# Patient Record
Sex: FEMALE | Race: WHITE | Employment: OTHER | URBAN - METROPOLITAN AREA
[De-identification: names, ages, dates, MRNs, and addresses within clinical notes are randomized per-mention and may not be internally consistent; named-entity substitution may affect disease eponyms.]

---

## 2017-04-10 ENCOUNTER — APPOINTMENT (EMERGENCY)
Dept: RADIOLOGY | Facility: HOSPITAL | Age: 57
End: 2017-04-10
Payer: COMMERCIAL

## 2017-04-10 ENCOUNTER — HOSPITAL ENCOUNTER (EMERGENCY)
Facility: HOSPITAL | Age: 57
Discharge: HOME | End: 2017-04-10
Attending: EMERGENCY MEDICINE | Admitting: EMERGENCY MEDICINE
Payer: COMMERCIAL

## 2017-04-10 VITALS
SYSTOLIC BLOOD PRESSURE: 127 MMHG | WEIGHT: 229.06 LBS | TEMPERATURE: 98 F | DIASTOLIC BLOOD PRESSURE: 64 MMHG | BODY MASS INDEX: 44.97 KG/M2 | HEART RATE: 74 BPM | HEIGHT: 60 IN | OXYGEN SATURATION: 97 % | RESPIRATION RATE: 20 BRPM

## 2017-04-10 DIAGNOSIS — S82.409A CLOSED FIBULAR FRACTURE: Primary | ICD-10-CM

## 2017-04-10 PROCEDURE — 99283 EMERGENCY DEPT VISIT LOW MDM: CPT

## 2017-04-10 PROCEDURE — 73630 X-RAY EXAM OF FOOT: CPT

## 2017-04-10 PROCEDURE — 73610 X-RAY EXAM OF ANKLE: CPT

## 2017-04-10 RX ORDER — HYDROXYCHLOROQUINE SULFATE 200 MG/1
200 TABLET, FILM COATED ORAL 2 TIMES DAILY
COMMUNITY

## 2017-04-10 RX ORDER — OMEPRAZOLE 20 MG/1
20 CAPSULE, DELAYED RELEASE ORAL DAILY
COMMUNITY
End: 2019-06-10 | Stop reason: SDUPTHER

## 2017-04-10 RX ORDER — CHOLECALCIFEROL (VITAMIN D3) 125 MCG
2000 CAPSULE ORAL 2 TIMES DAILY
COMMUNITY

## 2017-04-10 RX ORDER — DILTIAZEM HYDROCHLORIDE 180 MG/1
180 CAPSULE, COATED, EXTENDED RELEASE ORAL DAILY
COMMUNITY

## 2017-04-10 RX ORDER — BRIMONIDINE TARTRATE 2 MG/ML
1 SOLUTION/ DROPS OPHTHALMIC 2 TIMES DAILY
COMMUNITY
End: 2018-12-19 | Stop reason: ALTCHOICE

## 2017-04-10 RX ORDER — ASPIRIN 81 MG/1
81 TABLET, CHEWABLE ORAL DAILY
COMMUNITY

## 2017-04-10 RX ORDER — TIZANIDINE 2 MG/1
2 TABLET ORAL EVERY 6 HOURS PRN
COMMUNITY
End: 2018-06-19 | Stop reason: ALTCHOICE

## 2017-04-10 RX ORDER — GABAPENTIN 100 MG/1
100 CAPSULE ORAL 2 TIMES DAILY
COMMUNITY
End: 2019-01-04 | Stop reason: SDUPTHER

## 2017-04-10 RX ORDER — MELOXICAM 7.5 MG/1
7.5 TABLET ORAL DAILY
COMMUNITY
End: 2018-06-19

## 2017-04-10 RX ORDER — PILOCARPINE HYDROCHLORIDE 5 MG/1
5 TABLET, FILM COATED ORAL 2 TIMES DAILY
COMMUNITY

## 2017-05-24 ENCOUNTER — ALLSCRIPTS OFFICE VISIT (OUTPATIENT)
Dept: OTHER | Facility: OTHER | Age: 57
End: 2017-05-24

## 2017-10-04 ENCOUNTER — TRANSCRIBE ORDERS (OUTPATIENT)
Dept: ADMINISTRATIVE | Facility: HOSPITAL | Age: 57
End: 2017-10-04

## 2017-10-04 ENCOUNTER — HOSPITAL ENCOUNTER (OUTPATIENT)
Dept: RADIOLOGY | Facility: HOSPITAL | Age: 57
Discharge: HOME | End: 2017-10-04
Payer: COMMERCIAL

## 2017-10-04 DIAGNOSIS — M79.671 PAIN OF RIGHT HEEL: Primary | ICD-10-CM

## 2017-10-04 DIAGNOSIS — M79.671 PAIN OF RIGHT HEEL: ICD-10-CM

## 2017-10-04 PROCEDURE — 73650 X-RAY EXAM OF HEEL: CPT

## 2017-12-13 ENCOUNTER — GENERIC CONVERSION - ENCOUNTER (OUTPATIENT)
Dept: OTHER | Facility: OTHER | Age: 57
End: 2017-12-13

## 2018-01-13 VITALS
HEIGHT: 61 IN | RESPIRATION RATE: 16 BRPM | SYSTOLIC BLOOD PRESSURE: 128 MMHG | WEIGHT: 236 LBS | BODY MASS INDEX: 44.56 KG/M2 | HEART RATE: 70 BPM | DIASTOLIC BLOOD PRESSURE: 78 MMHG

## 2018-01-24 VITALS
DIASTOLIC BLOOD PRESSURE: 70 MMHG | BODY MASS INDEX: 44.96 KG/M2 | HEIGHT: 60 IN | HEART RATE: 94 BPM | WEIGHT: 229 LBS | SYSTOLIC BLOOD PRESSURE: 126 MMHG

## 2018-04-13 ENCOUNTER — HOSPITAL ENCOUNTER (EMERGENCY)
Facility: HOSPITAL | Age: 58
Discharge: HOME | End: 2018-04-13
Attending: EMERGENCY MEDICINE | Admitting: EMERGENCY MEDICINE
Payer: COMMERCIAL

## 2018-04-13 ENCOUNTER — APPOINTMENT (EMERGENCY)
Dept: CT IMAGING | Facility: HOSPITAL | Age: 58
End: 2018-04-13
Payer: COMMERCIAL

## 2018-04-13 VITALS
HEART RATE: 68 BPM | TEMPERATURE: 97.8 F | DIASTOLIC BLOOD PRESSURE: 66 MMHG | OXYGEN SATURATION: 100 % | BODY MASS INDEX: 44.33 KG/M2 | RESPIRATION RATE: 20 BRPM | SYSTOLIC BLOOD PRESSURE: 122 MMHG | WEIGHT: 227 LBS

## 2018-04-13 DIAGNOSIS — R10.9 ABDOMINAL PAIN: Primary | ICD-10-CM

## 2018-04-13 DIAGNOSIS — K57.92 ACUTE DIVERTICULITIS: ICD-10-CM

## 2018-04-13 LAB
ALBUMIN SERPL BCP-MCNC: 3.6 G/DL (ref 3.5–5)
ALP SERPL-CCNC: 81 U/L (ref 46–116)
ALT SERPL W P-5'-P-CCNC: 25 U/L (ref 12–78)
ANION GAP SERPL CALCULATED.3IONS-SCNC: 9 MMOL/L (ref 4–13)
AST SERPL W P-5'-P-CCNC: 18 U/L (ref 5–45)
BACTERIA UR QL AUTO: ABNORMAL /HPF
BASOPHILS # BLD AUTO: 0.06 THOUSANDS/ΜL (ref 0–0.1)
BASOPHILS NFR BLD AUTO: 1 % (ref 0–1)
BILIRUB DIRECT SERPL-MCNC: 0.08 MG/DL (ref 0–0.2)
BILIRUB SERPL-MCNC: 0.4 MG/DL (ref 0.2–1)
BILIRUB UR QL STRIP: NEGATIVE
BUN SERPL-MCNC: 13 MG/DL (ref 5–25)
CALCIUM SERPL-MCNC: 9.2 MG/DL
CHLORIDE SERPL-SCNC: 108 MMOL/L (ref 100–108)
CLARITY UR: CLEAR
CO2 SERPL-SCNC: 28 MMOL/L (ref 21–32)
COLOR UR: YELLOW
CREAT SERPL-MCNC: 0.94 MG/DL (ref 0.6–1.3)
EOSINOPHIL # BLD AUTO: 0.41 THOUSAND/ΜL (ref 0–0.61)
EOSINOPHIL NFR BLD AUTO: 5 % (ref 0–6)
ERYTHROCYTE [DISTWIDTH] IN BLOOD BY AUTOMATED COUNT: 12.3 % (ref 11.6–15.1)
GFR SERPL CREATININE-BSD FRML MDRD: 68 ML/MIN/1.73SQ M
GLUCOSE SERPL-MCNC: 110 MG/DL (ref 65–140)
GLUCOSE UR STRIP-MCNC: NEGATIVE MG/DL
HCT VFR BLD AUTO: 35.7 % (ref 34.8–46.1)
HGB BLD-MCNC: 11.9 G/DL (ref 11.5–15.4)
HGB UR QL STRIP.AUTO: ABNORMAL
KETONES UR STRIP-MCNC: ABNORMAL MG/DL
LACTATE SERPL-SCNC: 1.7 MMOL/L (ref 0.5–2)
LEUKOCYTE ESTERASE UR QL STRIP: NEGATIVE
LIPASE SERPL-CCNC: 116 U/L (ref 73–393)
LYMPHOCYTES # BLD AUTO: 2.71 THOUSANDS/ΜL (ref 0.6–4.47)
LYMPHOCYTES NFR BLD AUTO: 32 % (ref 14–44)
MCH RBC QN AUTO: 27.6 PG (ref 26.8–34.3)
MCHC RBC AUTO-ENTMCNC: 33.3 G/DL (ref 31.4–37.4)
MCV RBC AUTO: 83 FL (ref 82–98)
MONOCYTES # BLD AUTO: 0.7 THOUSAND/ΜL (ref 0.17–1.22)
MONOCYTES NFR BLD AUTO: 8 % (ref 4–12)
NEUTROPHILS # BLD AUTO: 4.68 THOUSANDS/ΜL (ref 1.85–7.62)
NEUTS SEG NFR BLD AUTO: 55 % (ref 43–75)
NITRITE UR QL STRIP: NEGATIVE
NON-SQ EPI CELLS URNS QL MICRO: ABNORMAL /HPF
NRBC BLD AUTO-RTO: 0 /100 WBCS
PH UR STRIP.AUTO: 6 [PH] (ref 4.5–8)
PLATELET # BLD AUTO: 219 THOUSANDS/UL (ref 149–390)
PMV BLD AUTO: 9.5 FL (ref 8.9–12.7)
POTASSIUM SERPL-SCNC: 3.3 MMOL/L (ref 3.5–5.3)
PROT SERPL-MCNC: 6.8 G/DL (ref 6.4–8.2)
PROT UR STRIP-MCNC: NEGATIVE MG/DL
RBC # BLD AUTO: 4.31 MILLION/UL (ref 3.81–5.12)
RBC #/AREA URNS AUTO: ABNORMAL /HPF
SODIUM SERPL-SCNC: 145 MMOL/L (ref 136–145)
SP GR UR STRIP.AUTO: >=1.03 (ref 1–1.03)
UROBILINOGEN UR QL STRIP.AUTO: 0.2 E.U./DL
WBC # BLD AUTO: 8.59 THOUSAND/UL (ref 4.31–10.16)
WBC #/AREA URNS AUTO: ABNORMAL /HPF

## 2018-04-13 PROCEDURE — 83690 ASSAY OF LIPASE: CPT | Performed by: EMERGENCY MEDICINE

## 2018-04-13 PROCEDURE — 96365 THER/PROPH/DIAG IV INF INIT: CPT

## 2018-04-13 PROCEDURE — 85025 COMPLETE CBC W/AUTO DIFF WBC: CPT | Performed by: EMERGENCY MEDICINE

## 2018-04-13 PROCEDURE — 96375 TX/PRO/DX INJ NEW DRUG ADDON: CPT

## 2018-04-13 PROCEDURE — 80048 BASIC METABOLIC PNL TOTAL CA: CPT | Performed by: EMERGENCY MEDICINE

## 2018-04-13 PROCEDURE — 99284 EMERGENCY DEPT VISIT MOD MDM: CPT

## 2018-04-13 PROCEDURE — 80076 HEPATIC FUNCTION PANEL: CPT | Performed by: EMERGENCY MEDICINE

## 2018-04-13 PROCEDURE — 96367 TX/PROPH/DG ADDL SEQ IV INF: CPT

## 2018-04-13 PROCEDURE — 36415 COLL VENOUS BLD VENIPUNCTURE: CPT | Performed by: EMERGENCY MEDICINE

## 2018-04-13 PROCEDURE — 83605 ASSAY OF LACTIC ACID: CPT | Performed by: EMERGENCY MEDICINE

## 2018-04-13 PROCEDURE — 74177 CT ABD & PELVIS W/CONTRAST: CPT

## 2018-04-13 PROCEDURE — 81001 URINALYSIS AUTO W/SCOPE: CPT | Performed by: EMERGENCY MEDICINE

## 2018-04-13 PROCEDURE — 96361 HYDRATE IV INFUSION ADD-ON: CPT

## 2018-04-13 RX ORDER — CIPROFLOXACIN 500 MG/1
500 TABLET, FILM COATED ORAL 2 TIMES DAILY
Qty: 20 TABLET | Refills: 0 | Status: SHIPPED | OUTPATIENT
Start: 2018-04-13 | End: 2018-04-23

## 2018-04-13 RX ORDER — CIPROFLOXACIN 2 MG/ML
400 INJECTION, SOLUTION INTRAVENOUS ONCE
Status: COMPLETED | OUTPATIENT
Start: 2018-04-13 | End: 2018-04-13

## 2018-04-13 RX ORDER — KETOROLAC TROMETHAMINE 30 MG/ML
30 INJECTION, SOLUTION INTRAMUSCULAR; INTRAVENOUS ONCE
Status: COMPLETED | OUTPATIENT
Start: 2018-04-13 | End: 2018-04-13

## 2018-04-13 RX ORDER — METRONIDAZOLE 500 MG/1
500 TABLET ORAL 3 TIMES DAILY
Qty: 21 TABLET | Refills: 0 | Status: SHIPPED | OUTPATIENT
Start: 2018-04-13 | End: 2018-04-20

## 2018-04-13 RX ORDER — POTASSIUM CHLORIDE 20 MEQ/1
40 TABLET, EXTENDED RELEASE ORAL ONCE
Status: COMPLETED | OUTPATIENT
Start: 2018-04-13 | End: 2018-04-13

## 2018-04-13 RX ADMIN — METRONIDAZOLE 500 MG: 500 INJECTION, SOLUTION INTRAVENOUS at 19:00

## 2018-04-13 RX ADMIN — SODIUM CHLORIDE 1000 ML: 0.9 INJECTION, SOLUTION INTRAVENOUS at 17:08

## 2018-04-13 RX ADMIN — POTASSIUM CHLORIDE 40 MEQ: 1500 TABLET, EXTENDED RELEASE ORAL at 18:33

## 2018-04-13 RX ADMIN — CIPROFLOXACIN 400 MG: 2 INJECTION, SOLUTION INTRAVENOUS at 19:30

## 2018-04-13 RX ADMIN — IOHEXOL 100 ML: 350 INJECTION, SOLUTION INTRAVENOUS at 18:21

## 2018-04-13 RX ADMIN — KETOROLAC TROMETHAMINE 30 MG: 30 INJECTION, SOLUTION INTRAMUSCULAR at 17:09

## 2018-04-13 NOTE — DISCHARGE INSTRUCTIONS
Diverticulitis   WHAT YOU NEED TO KNOW:   Diverticulitis is a condition that causes small pockets along your intestine called diverticula to become inflamed or infected  This is caused by hard bowel movements, food, or bacteria that get stuck in the pockets  DISCHARGE INSTRUCTIONS:   Return to the emergency department if:   · You have bowel movement or foul-smelling discharge leaking from your vagina or in your urine  · You have severe diarrhea  · You urinate less than usual or not at all  · You are not able to have a bowel movement  · You cannot stop vomiting  · You have severe abdominal pain, a fever, and your abdomen is larger than usual      · You have new or increased blood in your bowel movements  Contact your healthcare provider if:   · You have pain when you urinate  · Your symptoms get worse or do not go away  · You have questions or concerns about your condition or care  Medicines:   · Antibiotics  may be given to help treat a bacterial infection  · Prescription pain medicine  may be given  Ask your healthcare provider how to take this medicine safely  Some prescription pain medicines contain acetaminophen  Do not take other medicines that contain acetaminophen without talking to your healthcare provider  Too much acetaminophen may cause liver damage  Prescription pain medicine may cause constipation  Ask your healthcare provider how to prevent or treat constipation  · Take your medicine as directed  Contact your healthcare provider if you think your medicine is not helping or if you have side effects  Tell him or her if you are allergic to any medicine  Keep a list of the medicines, vitamins, and herbs you take  Include the amounts, and when and why you take them  Bring the list or the pill bottles to follow-up visits  Carry your medicine list with you in case of an emergency  Clear liquid diet:  A clear liquid diet includes any liquids that you can see through  Examples include water, ginger-debora, cranberry or apple juice, frozen fruit ice, or broth  Stay on a clear liquid diet until your symptoms are gone, or as directed  Follow up with your healthcare provider as directed: You may need to return for a colonoscopy  When your symptoms are gone, you may need a low-fat, high-fiber diet to prevent diverticulitis from developing again  Your healthcare provider or dietitian can help you create meal plans  Write down your questions so you remember to ask them during your visits  © 2017 [facility] Information is for End User's use only and may not be sold, redistributed or otherwise used for commercial purposes  All illustrations and images included in CareNotes® are the copyrighted property of [employer]  or [address]  The above information is an  only  It is not intended as medical advice for individual conditions or treatments  Talk to your doctor, nurse or pharmacist before following any medical regimen to see if it is safe and effective for you

## 2018-04-13 NOTE — ED PROVIDER NOTES
History  Chief Complaint   Patient presents with    Abdominal Pain     Pt c/o LLQ pain x 1 hr  Pt has hx of diverticulosis     HPI  59-year-old white female with a chief complaint of left lower quadrant pain  Patient states she has been feeling some pain in her back and had a kidney scan which was negative however today after eating a salad she developed some severe left lower quadrant pain  Patient states she does has a history of diverticulitis  Patient denies any fever or chills  Prior to Admission Medications   Prescriptions Last Dose Informant Patient Reported? Taking? Cholecalciferol (VITAMIN D3) 2000 units TABS   Yes No   Sig: Take 2,000 Units by mouth 2 (two) times a day   aspirin 81 mg chewable tablet   Yes No   Sig: Chew 81 mg daily   brimonidine tartrate 0 2 % ophthalmic solution   Yes No   Sig: Administer 1 drop to both eyes 2 (two) times a day   diltiazem (CARDIZEM CD) 180 mg 24 hr capsule   Yes No   Sig: Take 180 mg by mouth daily   gabapentin (NEURONTIN) 100 mg capsule   Yes No   Sig: Take 100 mg by mouth 2 (two) times a day   hydroxychloroquine (PLAQUENIL) 200 mg tablet   Yes No   Sig: Take 200 mg by mouth 2 (two) times a day   meloxicam (MOBIC) 7 5 mg tablet   Yes No   Sig: Take 7 5 mg by mouth daily   omeprazole (PriLOSEC) 20 mg delayed release capsule   Yes No   Sig: Take 20 mg by mouth daily   pilocarpine (SALAGEN) 5 mg tablet   Yes No   Sig: Take 5 mg by mouth 2 (two) times a day   tiZANidine (ZANAFLEX) 2 mg tablet   Yes No   Sig: Take 2 mg by mouth every 6 (six) hours as needed for muscle spasms      Facility-Administered Medications: None       Past Medical History:   Diagnosis Date    A-fib [facility])     Cardiac disease     Diverticulosis     Fibromyalgia     GERD (gastroesophageal reflux disease)     Heart disease     Lupus        Past Surgical History:   Procedure Laterality Date    APPENDECTOMY      CHOLECYSTECTOMY      TUBAL LIGATION         History reviewed   No pertinent family history  I have reviewed and agree with the history as documented  Social History   Substance Use Topics    Smoking status: Never Smoker    Smokeless tobacco: Never Used    Alcohol use Yes      Comment: social        Review of Systems   Constitutional: Negative for chills and fever  HENT: Negative for congestion and rhinorrhea  Eyes: Negative for discharge and visual disturbance  Respiratory: Negative for shortness of breath and wheezing  Cardiovascular: Negative for chest pain and palpitations  Gastrointestinal: Positive for abdominal pain  Negative for abdominal distention, nausea and vomiting  Endocrine: Negative for polydipsia and polyuria  Genitourinary: Negative for dysuria and hematuria  Musculoskeletal: Negative for arthralgias, gait problem and neck stiffness  Skin: Negative for rash and wound  Neurological: Negative for dizziness and headaches  Psychiatric/Behavioral: Negative for confusion and suicidal ideas  Physical Exam  ED Triage Vitals [04/13/18 1618]   Temperature Pulse Respirations Blood Pressure SpO2   97 8 °F (36 6 °C) 78 20 141/92 100 %      Temp Source Heart Rate Source Patient Position - Orthostatic VS BP Location FiO2 (%)   Oral Monitor Sitting Left arm --      Pain Score       Worst Possible Pain           Orthostatic Vital Signs  Vitals:    04/13/18 1618 04/13/18 1711 04/13/18 1730 04/13/18 1900   BP: 141/92 123/68 109/63 116/58   Pulse: 78 70 70 69   Patient Position - Orthostatic VS: Sitting          Physical Exam   Constitutional: She is oriented to person, place, and time  She appears well-developed and well-nourished  69-year-old white female lying on the stretcher and some distress with left lower quadrant pain   HENT:   Head: Normocephalic and atraumatic  Mouth/Throat: Oropharynx is clear and moist    Eyes: EOM are normal  Pupils are equal, round, and reactive to light  Neck: Normal range of motion  Neck supple     Cardiovascular: Normal rate, regular rhythm and normal heart sounds  Pulmonary/Chest: Effort normal and breath sounds normal  No respiratory distress  She has no wheezes  She exhibits no tenderness  Abdominal: Soft  Bowel sounds are normal  She exhibits no mass  There is tenderness (Positive tenderness to the left lower quadrant suprapubic region)  There is no rebound and no guarding  Musculoskeletal: Normal range of motion  Neurological: She is alert and oriented to person, place, and time  No cranial nerve deficit  She exhibits normal muscle tone  Coordination normal    Skin: Skin is warm and dry  Psychiatric: She has a normal mood and affect  Nursing note and vitals reviewed        ED Medications  Medications   ciprofloxacin (CIPRO) IVPB (premix) 400 mg (not administered)   metroNIDAZOLE (FLAGYL) IVPB (premix) 500 mg (500 mg Intravenous New Bag 4/13/18 1900)   sodium chloride 0 9 % bolus 1,000 mL (0 mL Intravenous Stopped 4/13/18 1808)   ketorolac (TORADOL) injection 30 mg (30 mg Intravenous Given 4/13/18 1709)   potassium chloride (K-DUR,KLOR-CON) CR tablet 40 mEq (40 mEq Oral Given 4/13/18 1833)   iohexol (OMNIPAQUE) 350 MG/ML injection (MULTI-DOSE) 100 mL (100 mL Intravenous Given 4/13/18 1821)       Diagnostic Studies  Results Reviewed     Procedure Component Value Units Date/Time    Urine Microscopic [[id number]]  (Abnormal) Collected:  04/13/18 1743    Lab Status:  Final result Specimen:  Urine from Urine, Clean Catch Updated:  04/13/18 1801     RBC, UA 0-1 (A) /hpf      WBC, UA None Seen /hpf      Epithelial Cells Occasional /hpf      Bacteria, UA None Seen /hpf     UA w Reflex to Microscopic w Reflex to Culture [[id number]]  (Abnormal) Collected:  04/13/18 1743    Lab Status:  Final result Specimen:  Urine from Urine, Clean Catch Updated:  04/13/18 1751     Color, UA Yellow     Clarity, UA Clear     Specific Gravity, UA >=1 030     pH, UA 6 0     Leukocytes, UA Negative     Nitrite, UA Negative     Protein, UA Negative mg/dl      Glucose, UA Negative mg/dl      Ketones, UA Trace (A) mg/dl      Urobilinogen, UA 0 2 E U /dl      Bilirubin, UA Negative     Blood, UA Moderate (A)    Lactic acid, plasma [[id number]]  (Normal) Collected:  04/13/18 1709    Lab Status:  Final result Specimen:  Blood from Arm, Right Updated:  04/13/18 1740     LACTIC ACID 1 7 mmol/L     Narrative:         Result may be elevated if tourniquet was used during collection  Basic metabolic panel [[id number]]  (Abnormal) Collected:  04/13/18 1709    Lab Status:  Final result Specimen:  Blood from Arm, Right Updated:  04/13/18 1737     Sodium 145 mmol/L      Potassium 3 3 (L) mmol/L      Chloride 108 mmol/L      CO2 28 mmol/L      Anion Gap 9 mmol/L      BUN 13 mg/dL      Creatinine 0 94 mg/dL      Glucose 110 mg/dL      Calcium 9 2 mg/dL      eGFR 68 ml/min/1 73sq m     Narrative:         National Kidney Disease Education Program recommendations are as follows:  GFR calculation is accurate only with a steady state creatinine  Chronic Kidney disease less than 60 ml/min/1 73 sq  meters  Kidney failure less than 15 ml/min/1 73 sq  meters      Hepatic function panel [[id number]]  (Normal) Collected:  04/13/18 1709    Lab Status:  Final result Specimen:  Blood from Arm, Right Updated:  04/13/18 1737     Total Bilirubin 0 40 mg/dL      Bilirubin, Direct 0 08 mg/dL      Alkaline Phosphatase 81 U/L      AST 18 U/L      ALT 25 U/L      Total Protein 6 8 g/dL      Albumin 3 6 g/dL     Lipase [[id number]]  (Normal) Collected:  04/13/18 1709    Lab Status:  Final result Specimen:  Blood from Arm, Right Updated:  04/13/18 1737     Lipase 116 u/L     CBC and differential [[id number]]  (Normal) Collected:  04/13/18 1709    Lab Status:  Final result Specimen:  Blood from Arm, Right Updated:  04/13/18 1714     WBC 8 59 Thousand/uL      RBC 4 31 Million/uL      Hemoglobin 11 9 g/dL      Hematocrit 35 7 %      MCV 83 fL      MCH 27 6 pg      MCHC 33 3 g/dL      RDW 12 3 % MPV 9 5 fL      Platelets 668 Thousands/uL      nRBC 0 /100 WBCs      Neutrophils Relative 55 %      Lymphocytes Relative 32 %      Monocytes Relative 8 %      Eosinophils Relative 5 %      Basophils Relative 1 %      Neutrophils Absolute 4 68 Thousands/µL      Lymphocytes Absolute 2 71 Thousands/µL      Monocytes Absolute 0 70 Thousand/µL      Eosinophils Absolute 0 41 Thousand/µL      Basophils Absolute 0 06 Thousands/µL                  CT abdomen pelvis with contrast   Final Result by Dee Palm MD (04/13 1836)      Focus of mild pericolonic inflammatory stranding involving the mid sigmoid colon with a few associated diverticula  The findings are compatible with mild acute diverticulitis  There is no free air or free fluid to suggest a perforation  Diffuse fatty infiltration of the liver  Workstation performed: [id number]                    Procedures  Procedures       Phone Contacts  ED Phone Contact    ED Course  ED Course      US of kidneys/Renal    History: Left flank pain         Study: Ultrasound retroperitoneum limited         Diagnosis: Normal study         Comment:    The kidneys are normal in size, contour and position     The right kidney approximates 11 8 cm in length; the left 11 2 cm         There is no inflammatory process or scarring         There is no mass, calculus or hydronephrosis          Limited urinary bladder evaluation is normal     Bilateral ureteral jets are seen           ys/Renal from 3/12/18:         7:00 p m :  I reviewed the CT scan results with the patient as well as her lab work  I explained to patient that we would give her 1 dose of Cipro and Flagyl here now to treat her acute diverticulitis  I will place patient on Cipro and Flagyl as an outpatient and follow up with Dr Shannon Bee her GI doctor  Patient states she has medications for pain at home  MDM  CritCare Time     Differential diagnosis includes:  1    Acute abdominal pain  2  Acute left lower quadrant pain  3  Diverticulitis  4  UTI  5  Renal colic  6  Constipation    Disposition  Final diagnoses:   Abdominal pain   Acute diverticulitis     Time reflects when diagnosis was documented in both MDM as applicable and the Disposition within this note     Time User Action Codes Description Comment    4/13/2018  7:05 PM Travis Valdezco Add [R10 9] Abdominal pain     4/13/2018  7:05 PM Travis Valdezco Add [K57 92] Acute diverticulitis       ED Disposition     ED Disposition Condition Comment    Discharge  Suraj Han discharge to home/self care  Condition at discharge: Good        Follow-up Information     Follow up With Specialties Details Why Contact Info    Althea Khan MD Internal Medicine In 1 week  [address] [place] [address]      Xena Curtis DO  In 1 week  [facility]  [address] [address] [address]  [phone]          Patient's Medications   Discharge Prescriptions    CIPROFLOXACIN (CIPRO) 500 MG TABLET    Take 1 tablet (500 mg total) by mouth 2 (two) times a day for 10 days       Start Date: 4/13/2018 End Date: 4/23/2018       Order Dose: 500 mg       Quantity: 20 tablet    Refills: 0    METRONIDAZOLE (FLAGYL) 500 MG TABLET    Take 1 tablet (500 mg total) by mouth 3 (three) times a day for 7 days       Start Date: 4/13/2018 End Date: 4/20/2018       Order Dose: 500 mg       Quantity: 21 tablet    Refills: 0     No discharge procedures on file      ED Provider  Electronically Signed by           Sol Carpenter DO  04/13/18 4537

## 2018-06-19 ENCOUNTER — OFFICE VISIT (OUTPATIENT)
Dept: NEUROLOGY | Facility: CLINIC | Age: 58
End: 2018-06-19
Payer: COMMERCIAL

## 2018-06-19 VITALS
BODY MASS INDEX: 42.67 KG/M2 | HEART RATE: 92 BPM | HEIGHT: 61 IN | DIASTOLIC BLOOD PRESSURE: 70 MMHG | WEIGHT: 226 LBS | SYSTOLIC BLOOD PRESSURE: 108 MMHG

## 2018-06-19 DIAGNOSIS — M79.7 FIBROMYALGIA: Primary | ICD-10-CM

## 2018-06-19 DIAGNOSIS — G50.9 TRIGEMINAL SENSORY LOSS: ICD-10-CM

## 2018-06-19 PROCEDURE — 99213 OFFICE O/P EST LOW 20 MIN: CPT | Performed by: PSYCHIATRY & NEUROLOGY

## 2018-06-19 RX ORDER — CYCLOBENZAPRINE HCL 5 MG
1 TABLET ORAL 3 TIMES DAILY PRN
COMMUNITY
End: 2018-12-19 | Stop reason: ALTCHOICE

## 2018-06-19 NOTE — PROGRESS NOTES
Progress Note - Neurology   Ricky Mcbride 62 y o  female MRN: [id number]  Unit/Bed#:  Encounter: [id number]      Subjective:   Patient is here for a follow-up visit with a history of chronic fibromyalgia, trigeminal nerve dysfunction right V2 distribution and generally has been doing well in the recent past   Patient also has been having recurrent bouts of diverticulitis and is to undergo surgical resection in the near future  She denies any new neurological symptoms and remains on gabapentin 100 mg twice a day and Flexeril 5 mg at bedtime as needed  ROS:   Review of Systems   Constitutional: Negative  HENT: Negative  Eyes: Negative  Respiratory: Negative  Cardiovascular: Negative  Gastrointestinal: Positive for constipation and nausea  Endocrine: Positive for cold intolerance and heat intolerance  Genitourinary: Positive for frequency  Musculoskeletal: Positive for back pain, gait problem, joint swelling and neck pain  Skin: Positive for rash  Allergic/Immunologic: Negative  Neurological: Positive for dizziness  Hematological: Bruises/bleeds easily  Psychiatric/Behavioral: The patient is nervous/anxious and is hyperactive  Vitals:   Vitals:    06/19/18 1333   BP: 108/70   Pulse: 92   ,Body mass index is 42 7 kg/m²      MEDS:      Current Outpatient Prescriptions:     aspirin 81 mg chewable tablet, Chew 81 mg daily, Disp: , Rfl:     brimonidine tartrate 0 2 % ophthalmic solution, Administer 1 drop to both eyes 2 (two) times a day, Disp: , Rfl:     Cholecalciferol (VITAMIN D3) 2000 units TABS, Take 2,000 Units by mouth 2 (two) times a day, Disp: , Rfl:     cyclobenzaprine (FLEXERIL) 5 mg tablet, Take 1 tablet by mouth 3 (three) times a day as needed, Disp: , Rfl:     diltiazem (CARDIZEM CD) 180 mg 24 hr capsule, Take 180 mg by mouth daily, Disp: , Rfl:     gabapentin (NEURONTIN) 100 mg capsule, Take 100 mg by mouth 2 (two) times a day, Disp: , Rfl:    hydroxychloroquine (PLAQUENIL) 200 mg tablet, Take 200 mg by mouth 2 (two) times a day, Disp: , Rfl:     omeprazole (PriLOSEC) 20 mg delayed release capsule, Take 20 mg by mouth daily, Disp: , Rfl:     pilocarpine (SALAGEN) 5 mg tablet, Take 5 mg by mouth 2 (two) times a day, Disp: , Rfl:     tiZANidine (ZANAFLEX) 2 mg tablet, Take 2 mg by mouth every 6 (six) hours as needed for muscle spasms, Disp: , Rfl:     Wheat Dextrin (BENEFIBER DRINK MIX PO), Take by mouth Twice daily, Disp: , Rfl:   :    Physical Exam:  General appearance: alert, appears stated age and cooperative  Head: Normocephalic, without obvious abnormality, atraumatic    Neurologic:  Her examination shows mild tenderness along trigger points throughout the spine  No new focal deficits were noted on motor and sensory exam   Her gait is normal based  No bruits were appreciable in the neck  Lab Results: I have personally reviewed pertinent reports  Imaging Studies: I have personally reviewed pertinent reports  Assessment:  1  Chronic fibromyalgia  2  Trigeminal nerve dysfunction  Plan:  Continue present medications, home exercise program is encouraged and patient will return back to see me in 6 months     6/19/2018,1:39 PM    Dictation voice to text software has been used in the creation of this document  Please consider this in light of any contextual or grammatical errors

## 2018-06-27 ENCOUNTER — TELEPHONE (OUTPATIENT)
Dept: NEUROLOGY | Facility: CLINIC | Age: 58
End: 2018-06-27

## 2018-06-27 NOTE — TELEPHONE ENCOUNTER
Pt called requesting most recent office notes  She is aware that she needs to fill out MCKENZIE before we can release any records  She verbalized understanding  She will be stopping by [facility] office to fill out MCKENZIE  Called [facility] office and made aware

## 2018-12-19 ENCOUNTER — OFFICE VISIT (OUTPATIENT)
Dept: NEUROLOGY | Facility: CLINIC | Age: 58
End: 2018-12-19
Payer: COMMERCIAL

## 2018-12-19 VITALS
BODY MASS INDEX: 39.65 KG/M2 | DIASTOLIC BLOOD PRESSURE: 90 MMHG | WEIGHT: 210 LBS | HEIGHT: 61 IN | SYSTOLIC BLOOD PRESSURE: 130 MMHG | HEART RATE: 80 BPM

## 2018-12-19 DIAGNOSIS — G50.9 TRIGEMINAL SENSORY LOSS: ICD-10-CM

## 2018-12-19 DIAGNOSIS — M79.7 FIBROMYALGIA: ICD-10-CM

## 2018-12-19 DIAGNOSIS — M54.81 OCCIPITAL NEURALGIA OF RIGHT SIDE: Primary | ICD-10-CM

## 2018-12-19 PROCEDURE — 99213 OFFICE O/P EST LOW 20 MIN: CPT | Performed by: PSYCHIATRY & NEUROLOGY

## 2018-12-19 RX ORDER — BRIMONIDINE TARTRATE 0.1 %
DROPS OPHTHALMIC (EYE) 2 TIMES DAILY
COMMUNITY
Start: 2018-11-16

## 2018-12-19 NOTE — PROGRESS NOTES
Progress Note - Neurology   Greg Briseno 62 y o  female MRN: [id number]  Unit/Bed#:  Encounter: [id number]      Subjective:   Patient is here for a follow-up visit with a history of chronic fibromyalgia, trigeminal neuralgia, and has been experiencing occasional headaches in the occipital head region on the right side  She remains on gabapentin which generally gives her adequate relief  Patient also is followed up with her rheumatologist on a regular basis and has been on Plaquenil, but otherwise denies any new neurological symptoms  ROS:   Review of Systems   Constitutional: Positive for fatigue  Negative for appetite change and fever  HENT: Negative  Negative for drooling, ear pain, hearing loss, tinnitus, trouble swallowing and voice change  Eyes: Positive for photophobia and visual disturbance  Negative for pain  Respiratory: Negative  Negative for shortness of breath  Cardiovascular: Negative  Negative for chest pain, palpitations and leg swelling  Gastrointestinal: Negative  Negative for abdominal pain, constipation, diarrhea, nausea and vomiting  Endocrine: Negative  Negative for cold intolerance and heat intolerance  Genitourinary: Positive for enuresis  Negative for dysuria, frequency and urgency  Musculoskeletal: Positive for arthralgias, back pain, myalgias and neck pain  Balance Difficulty   Skin: Negative  Negative for rash  Neurological: Positive for dizziness, tremors, light-headedness and numbness  Negative for seizures, syncope, facial asymmetry, speech difficulty, weakness and headaches  Hematological: Bruises/bleeds easily  Psychiatric/Behavioral: Positive for sleep disturbance  Negative for confusion, decreased concentration and hallucinations  The patient is not nervous/anxious  Vitals:   Vitals:    12/19/18 0925   BP: 130/90   Pulse: 80   ,Body mass index is 40 01 kg/m²      MEDS:      Current Outpatient Prescriptions:     ALPHAGAN P 0 1 %, , Disp: , Rfl:     aspirin 81 mg chewable tablet, Chew 81 mg daily, Disp: , Rfl:     Cholecalciferol (VITAMIN D3) 2000 units TABS, Take 2,000 Units by mouth 2 (two) times a day, Disp: , Rfl:     diltiazem (CARDIZEM CD) 180 mg 24 hr capsule, Take 180 mg by mouth daily, Disp: , Rfl:     gabapentin (NEURONTIN) 100 mg capsule, Take 100 mg by mouth 2 (two) times a day, Disp: , Rfl:     hydroxychloroquine (PLAQUENIL) 200 mg tablet, Take 200 mg by mouth 2 (two) times a day, Disp: , Rfl:     omeprazole (PriLOSEC) 20 mg delayed release capsule, Take 20 mg by mouth daily, Disp: , Rfl:     pilocarpine (SALAGEN) 5 mg tablet, Take 5 mg by mouth 2 (two) times a day, Disp: , Rfl:     Wheat Dextrin (BENEFIBER DRINK MIX PO), Take by mouth Twice daily, Disp: , Rfl:   :    Physical Exam:  General appearance: alert, appears stated age and cooperative  Head: Normocephalic, without obvious abnormality, atraumatic    Neurologic:  On neurological examination there is no evidence of any new focal cranial nerve, motor or sensory deficits in the upper or lower extremities  She has evidence of trigger point tenderness more prominent in the thoracic and cervical region, no evidence of any dysmetria and her gait is normal based  Patient also has significant right suboccipital  tenderness  Lab Results: I have personally reviewed pertinent reports  Imaging Studies: I have personally reviewed pertinent reports  Assessment:  1  Right-sided occipital neuralgia  2  Chronic fibromyalgia  3  Trigeminal nerve dysfunction    Plan:  Patient is advised to continue present medications, she remains on gabapentin 100 mg twice a day, home exercise program is encouraged and she will return back to see me in 6 months  12/19/2018,9:31 AM    Dictation voice to text software has been used in the creation of this document  Please consider this in light of any contextual or grammatical errors

## 2019-01-04 DIAGNOSIS — M79.7 FIBROMYALGIA: Primary | ICD-10-CM

## 2019-01-04 RX ORDER — GABAPENTIN 100 MG/1
CAPSULE ORAL
Qty: 180 CAPSULE | Refills: 3 | Status: SHIPPED | OUTPATIENT
Start: 2019-01-04 | End: 2020-01-08

## 2019-06-10 DIAGNOSIS — K21.9 GASTROESOPHAGEAL REFLUX DISEASE WITHOUT ESOPHAGITIS: Primary | ICD-10-CM

## 2019-06-10 RX ORDER — OMEPRAZOLE 20 MG/1
20 CAPSULE, DELAYED RELEASE ORAL DAILY
Qty: 30 CAPSULE | Refills: 6 | Status: SHIPPED | OUTPATIENT
Start: 2019-06-10 | End: 2020-01-17 | Stop reason: SDUPTHER

## 2019-07-08 RX ORDER — BENZOYL PEROXIDE 100 MG/ML
LIQUID TOPICAL
Refills: 0 | COMMUNITY
Start: 2019-04-18 | End: 2019-08-16 | Stop reason: ALTCHOICE

## 2019-07-10 ENCOUNTER — OFFICE VISIT (OUTPATIENT)
Dept: GASTROENTEROLOGY | Facility: CLINIC | Age: 59
End: 2019-07-10
Payer: COMMERCIAL

## 2019-07-10 ENCOUNTER — TELEPHONE (OUTPATIENT)
Dept: GASTROENTEROLOGY | Facility: CLINIC | Age: 59
End: 2019-07-10

## 2019-07-10 VITALS
DIASTOLIC BLOOD PRESSURE: 78 MMHG | HEART RATE: 88 BPM | HEIGHT: 61 IN | SYSTOLIC BLOOD PRESSURE: 118 MMHG | WEIGHT: 210.4 LBS | BODY MASS INDEX: 39.73 KG/M2

## 2019-07-10 DIAGNOSIS — D64.9 ANEMIA, UNSPECIFIED TYPE: Primary | ICD-10-CM

## 2019-07-10 PROCEDURE — 99213 OFFICE O/P EST LOW 20 MIN: CPT | Performed by: PHYSICIAN ASSISTANT

## 2019-07-10 RX ORDER — FERROUS SULFATE 325(65) MG
325 TABLET ORAL
COMMUNITY
End: 2019-08-16 | Stop reason: ALTCHOICE
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
## 2019-07-10 NOTE — TELEPHONE ENCOUNTER
Stephanie Gomse - Patient went to [facility] for Stool test  [facility] gave pt Insured - which does only 1 test not 3  They gave patient 3 test kits    Does patient have to do all 3 test  Please call patient at [phone]

## 2019-07-10 NOTE — PATIENT INSTRUCTIONS
Anemia   WHAT YOU NEED TO KNOW:   Anemia is a low number of red blood cells or a low amount of hemoglobin in your red blood cells  Hemoglobin is a protein that helps carry oxygen throughout your body  Red blood cells use iron to create hemoglobin  Anemia may develop if your body does not have enough iron  It may also develop if your body does not make enough red blood cells or they die faster than your body can make them  DISCHARGE INSTRUCTIONS:   Call 911 or have someone call 911 for any of the following:   · You lose consciousness  · You have severe chest pain  Return to the emergency department if:   · You have dark or bloody bowel movements  Contact your healthcare provider if:   · Your symptoms are worse, even after treatment  · You have questions or concerns about your condition or care  Medicines:   · Iron or folic acid supplements  help increase your red blood cell and hemoglobin levels  · Vitamin B12 injections  may help boost your red blood cell level and decrease your symptoms  Ask your healthcare provider how to inject B12  · Take your medicine as directed  Contact your healthcare provider if you think your medicine is not helping or if you have side effects  Tell him of her if you are allergic to any medicine  Keep a list of the medicines, vitamins, and herbs you take  Include the amounts, and when and why you take them  Bring the list or the pill bottles to follow-up visits  Carry your medicine list with you in case of an emergency  Prevent anemia:  Eat healthy foods rich in iron and vitamin C  Nuts, meat, dark leafy green vegetables, and beans are high in iron and protein  Vitamin C helps your body absorb iron  Foods rich in vitamin C include oranges and other citrus fruits  Ask your healthcare provider for a list of other foods that are high in iron or vitamin C  Ask if you need to be on a special diet     Follow up with your healthcare provider as directed:  Write down your questions so you remember to ask them during your visits  © 2017 [employer] Information is for End User's use only and may not be sold, redistributed or otherwise used for commercial purposes  All illustrations and images included in CareNotes® are the copyrighted property of A D A M , Inc  or Elliot Boyd  The above information is an  only  It is not intended as medical advice for individual conditions or treatments  Talk to your doctor, nurse or pharmacist before following any medical regimen to see if it is safe and effective for you

## 2019-07-11 NOTE — TELEPHONE ENCOUNTER
Called pt and pt said she was just "there" and said Gladys Moore told her she can just do the one that has the two samples  Pt said if she does three separate tests then her insurance will bill her three times and she will have a large bill

## 2019-08-08 ENCOUNTER — TELEPHONE (OUTPATIENT)
Dept: GASTROENTEROLOGY | Facility: CLINIC | Age: 59
End: 2019-08-08

## 2019-08-16 ENCOUNTER — OFFICE VISIT (OUTPATIENT)
Dept: NEUROLOGY | Facility: CLINIC | Age: 59
End: 2019-08-16
Payer: COMMERCIAL

## 2019-08-16 VITALS
BODY MASS INDEX: 41.82 KG/M2 | SYSTOLIC BLOOD PRESSURE: 104 MMHG | HEART RATE: 80 BPM | DIASTOLIC BLOOD PRESSURE: 74 MMHG | WEIGHT: 213 LBS | HEIGHT: 60 IN

## 2019-08-16 DIAGNOSIS — M79.7 FIBROMYALGIA: ICD-10-CM

## 2019-08-16 DIAGNOSIS — G50.9 TRIGEMINAL SENSORY LOSS: Primary | ICD-10-CM

## 2019-08-16 PROCEDURE — 99213 OFFICE O/P EST LOW 20 MIN: CPT | Performed by: PSYCHIATRY & NEUROLOGY

## 2019-08-16 NOTE — PROGRESS NOTES
Progress Note - Neurology   Devi Moore 62 y o  female MRN: [id number]  Unit/Bed#:  Encounter: [id number]      Subjective:   Patient is here for a follow-up visit with a history of chronic fibromyalgia, right trigeminal nerve dysfunction, and in the recent past was detected to have severe iron deficiency by her rheumatologist and since then was received iron infusions, and a GI workup is in progress  She occasionally complains of tingling numbness in her left hand but otherwise denies any other focal sensory or motor symptoms  She is on a home exercise program and from the fibromyalgia standpoint has been stable  ROS:   Review of Systems   Constitutional: Positive for fatigue  Negative for appetite change and fever  HENT: Positive for tinnitus  Negative for hearing loss, trouble swallowing and voice change  Eyes: Positive for photophobia and pain (a vibratory sensation in both eyes)  Respiratory: Positive for shortness of breath  Cardiovascular: Negative  Negative for palpitations  Gastrointestinal: Negative  Negative for nausea and vomiting  Endocrine: Negative  Negative for cold intolerance and heat intolerance  Genitourinary: Positive for flank pain  Negative for dysuria, frequency and urgency  Musculoskeletal: Positive for arthralgias, back pain, gait problem, myalgias and neck pain  Skin: Negative  Negative for rash  Neurological: Positive for dizziness, tremors (right arm), weakness (hands) and numbness (left arm and fingers)  Negative for seizures, syncope, facial asymmetry, speech difficulty, light-headedness and headaches  Has pain around eyes when head is tilted back   Hematological: Negative  Does not bruise/bleed easily  Psychiatric/Behavioral: Positive for decreased concentration and sleep disturbance  Negative for confusion and hallucinations  The patient is nervous/anxious          Vitals:   Vitals:    08/16/19 0957   BP: 104/74   BP Location: Left arm Patient Position: Sitting   Cuff Size: Adult   Pulse: 80   Weight: 96 6 kg (213 lb)   Height: 5' (1 524 m)   ,Body mass index is 41 6 kg/m²  MEDS:      Current Outpatient Medications:     ALPHAGAN P 0 1 %, , Disp: , Rfl:     aspirin 81 mg chewable tablet, Chew 81 mg daily, Disp: , Rfl:     Cholecalciferol (VITAMIN D3) 2000 units TABS, Take 2,000 Units by mouth 2 (two) times a day, Disp: , Rfl:     diltiazem (CARDIZEM CD) 180 mg 24 hr capsule, Take 180 mg by mouth daily, Disp: , Rfl:     gabapentin (NEURONTIN) 100 mg capsule, TAKE 1 CAPSULE TWICE A DAY, Disp: 180 capsule, Rfl: 3    hydroxychloroquine (PLAQUENIL) 200 mg tablet, Take 200 mg by mouth 2 (two) times a day, Disp: , Rfl:     omeprazole (PriLOSEC) 20 mg delayed release capsule, Take 1 capsule (20 mg total) by mouth daily, Disp: 30 capsule, Rfl: 6    pilocarpine (SALAGEN) 5 mg tablet, Take 5 mg by mouth 2 (two) times a day, Disp: , Rfl:     Wheat Dextrin (BENEFIBER DRINK MIX PO), Take by mouth Twice daily, Disp: , Rfl:   :    Physical Exam:  General appearance: alert, appears stated age and cooperative  Head: Normocephalic, without obvious abnormality, atraumatic    Her examination shows no evidence of any cranial nerve deficit, no sensory loss on either side of the face, no focal motor or sensory deficits were noted in the upper or lower extremities  Deep tendon reflexes are 1+, no evidence of any dysmetria and her gait is normal based  Mild tenderness was noted throughout the spine  Lab Results: I have personally reviewed pertinent reports  Imaging Studies: I have personally reviewed pertinent reports  Assessment:  1  Right trigeminal nerve dysfunction  2  Chronic fibromyalgia  Plan:  Patient is advised to continue gabapentin 100 mg twice a day, continue home exercise program, is followed up with Ophthalmology on a regular basis as well as Rheumatology and will return back to see me in 6 months        8/16/2019,10:00 AM    Dictation voice to text software has been used in the creation of this document  Please consider this in light of any contextual or grammatical errors

## 2019-09-05 ENCOUNTER — TELEPHONE (OUTPATIENT)
Dept: GASTROENTEROLOGY | Facility: CLINIC | Age: 59
End: 2019-09-05

## 2019-09-05 DIAGNOSIS — D64.9 ANEMIA, UNSPECIFIED TYPE: Primary | ICD-10-CM

## 2019-09-05 NOTE — TELEPHONE ENCOUNTER
Yes she should but can we find out if she has had a recent CBC to check her RBC count?   If not she needs to have one repeated

## 2019-09-05 NOTE — TELEPHONE ENCOUNTER
Spoke to pt and informed her someone from scheduling will contact her to set-up pill cam  Also told her CBC lab order is in Epic and for her to go to any any [facility] to get lab done  [place] - please call pt to set-up pill cam - Thank you

## 2019-09-05 NOTE — TELEPHONE ENCOUNTER
Jourdan Pierce - Patient called was told that if procedure came back normal that we would do the pill cam  Please advise   Please call patient at [phone] ty

## 2019-09-06 ENCOUNTER — APPOINTMENT (OUTPATIENT)
Dept: LAB | Facility: HOSPITAL | Age: 59
End: 2019-09-06
Payer: COMMERCIAL

## 2019-09-06 ENCOUNTER — TELEPHONE (OUTPATIENT)
Dept: GASTROENTEROLOGY | Facility: CLINIC | Age: 59
End: 2019-09-06

## 2019-09-06 DIAGNOSIS — D64.9 ANEMIA, UNSPECIFIED TYPE: ICD-10-CM

## 2019-09-06 LAB
BASOPHILS # BLD AUTO: 0.04 THOUSANDS/ΜL (ref 0–0.1)
BASOPHILS NFR BLD AUTO: 1 % (ref 0–1)
EOSINOPHIL # BLD AUTO: 0.26 THOUSAND/ΜL (ref 0–0.61)
EOSINOPHIL NFR BLD AUTO: 5 % (ref 0–6)
ERYTHROCYTE [DISTWIDTH] IN BLOOD BY AUTOMATED COUNT: 15.9 % (ref 11.6–15.1)
HCT VFR BLD AUTO: 39 % (ref 34.8–46.1)
HGB BLD-MCNC: 12.7 G/DL (ref 11.5–15.4)
IMM GRANULOCYTES # BLD AUTO: 0.02 THOUSAND/UL (ref 0–0.2)
IMM GRANULOCYTES NFR BLD AUTO: 0 % (ref 0–2)
LYMPHOCYTES # BLD AUTO: 1.56 THOUSANDS/ΜL (ref 0.6–4.47)
LYMPHOCYTES NFR BLD AUTO: 27 % (ref 14–44)
MCH RBC QN AUTO: 26.5 PG (ref 26.8–34.3)
MCHC RBC AUTO-ENTMCNC: 32.6 G/DL (ref 31.4–37.4)
MCV RBC AUTO: 81 FL (ref 82–98)
MONOCYTES # BLD AUTO: 0.49 THOUSAND/ΜL (ref 0.17–1.22)
MONOCYTES NFR BLD AUTO: 9 % (ref 4–12)
NEUTROPHILS # BLD AUTO: 3.36 THOUSANDS/ΜL (ref 1.85–7.62)
NEUTS SEG NFR BLD AUTO: 58 % (ref 43–75)
NRBC BLD AUTO-RTO: 0 /100 WBCS
PLATELET # BLD AUTO: 190 THOUSANDS/UL (ref 149–390)
PMV BLD AUTO: 10 FL (ref 8.9–12.7)
RBC # BLD AUTO: 4.8 MILLION/UL (ref 3.81–5.12)
WBC # BLD AUTO: 5.73 THOUSAND/UL (ref 4.31–10.16)

## 2019-09-06 PROCEDURE — 36415 COLL VENOUS BLD VENIPUNCTURE: CPT

## 2019-09-06 PROCEDURE — 85025 COMPLETE CBC W/AUTO DIFF WBC: CPT

## 2019-09-06 NOTE — TELEPHONE ENCOUNTER
----- Message from Donita Velasco PA-C sent at 9/6/2019  9:02 AM EDT -----  Please let her know her CBC is normal

## 2019-09-09 ENCOUNTER — TELEPHONE (OUTPATIENT)
Dept: NEUROLOGY | Facility: CLINIC | Age: 59
End: 2019-09-09

## 2019-09-09 NOTE — TELEPHONE ENCOUNTER
Patient called to check if we have received [employer] forms  Not found in Media or faxes  Informed patient we have centralized faxing and can sometimes take up to 12 hrs to get to chart  Patient will call back tomorrow to check  If not here she will stop by office to drop off forms  Patient made aware of turn around time and fee for forms

## 2019-09-24 ENCOUNTER — TELEPHONE (OUTPATIENT)
Dept: GASTROENTEROLOGY | Facility: CLINIC | Age: 59
End: 2019-09-24

## 2019-09-30 ENCOUNTER — TELEPHONE (OUTPATIENT)
Dept: GASTROENTEROLOGY | Facility: CLINIC | Age: 59
End: 2019-09-30

## 2019-09-30 NOTE — TELEPHONE ENCOUNTER
Called number and announcement stating phone not taking calls  Called mobile number and [facility]    Routing to Julio Bhat to call and schedule an appt

## 2019-10-09 ENCOUNTER — TELEPHONE (OUTPATIENT)
Dept: GASTROENTEROLOGY | Facility: CLINIC | Age: 59
End: 2019-10-09

## 2019-10-15 ENCOUNTER — TELEPHONE (OUTPATIENT)
Dept: GASTROENTEROLOGY | Facility: CLINIC | Age: 59
End: 2019-10-15

## 2019-10-15 DIAGNOSIS — R10.9 ABDOMINAL PAIN, UNSPECIFIED ABDOMINAL LOCATION: Primary | ICD-10-CM

## 2019-10-15 NOTE — TELEPHONE ENCOUNTER
Lul pt - Pt states she is having pain in her lower abdomen and she is concerned because she had a pill cam last week and she and is worried it may not have passed  Please call [phone]   Ty

## 2019-10-15 NOTE — TELEPHONE ENCOUNTER
Spoke with patient history of anemia    Patient c/o left sided flank pain ache with pinching that radiates to her abdomen above her hip  She is having normal formed BM  Denies melena, hematochezia, nausea, vomiting, fever, chills  Patient is concerned pill cam has not passed  Would you like patient to have imaging? Any other suggestions?

## 2019-10-16 ENCOUNTER — HOSPITAL ENCOUNTER (OUTPATIENT)
Dept: RADIOLOGY | Facility: HOSPITAL | Age: 59
Discharge: HOME | End: 2019-10-16
Payer: COMMERCIAL

## 2019-10-16 DIAGNOSIS — R10.9 ABDOMINAL PAIN, UNSPECIFIED ABDOMINAL LOCATION: ICD-10-CM

## 2019-10-16 PROCEDURE — 74018 RADEX ABDOMEN 1 VIEW: CPT

## 2019-10-17 ENCOUNTER — TELEPHONE (OUTPATIENT)
Dept: GASTROENTEROLOGY | Facility: CLINIC | Age: 59
End: 2019-10-17

## 2019-10-17 NOTE — TELEPHONE ENCOUNTER
Lul pt - Pt wanted to know if the results of her x-rays from yesterday were in yet, please call [phone]   Ty

## 2019-10-18 ENCOUNTER — TELEPHONE (OUTPATIENT)
Dept: GASTROENTEROLOGY | Facility: CLINIC | Age: 59
End: 2019-10-18

## 2019-10-18 NOTE — TELEPHONE ENCOUNTER
Kaleb Frazier - patient called for results of Xray Abdomin please call Keenan Espinoza at [phone] ty

## 2019-10-21 NOTE — TELEPHONE ENCOUNTER
Patient called - lmom - X-Ray test last week   Please call Rita Tavarez with results at [phone] ty

## 2019-10-23 ENCOUNTER — TELEPHONE (OUTPATIENT)
Dept: GASTROENTEROLOGY | Facility: CLINIC | Age: 59
End: 2019-10-23

## 2019-10-23 NOTE — TELEPHONE ENCOUNTER
----- Message from Marialuisa Gardner PA-C sent at 10/23/2019 11:21 AM EDT -----  Please inform patient that this is normal Thanks

## 2019-11-18 ENCOUNTER — TELEPHONE (OUTPATIENT)
Dept: GASTROENTEROLOGY | Facility: CLINIC | Age: 59
End: 2019-11-18

## 2019-11-18 NOTE — TELEPHONE ENCOUNTER
Lul pt- Pt states she was supposed to get a call to reschedule her pill cam since it failed twice? Please call [phone]   Ty

## 2019-11-20 ENCOUNTER — OFFICE VISIT (OUTPATIENT)
Dept: GASTROENTEROLOGY | Facility: CLINIC | Age: 59
End: 2019-11-20
Payer: COMMERCIAL

## 2019-11-20 DIAGNOSIS — D50.9 IRON DEFICIENCY ANEMIA, UNSPECIFIED IRON DEFICIENCY ANEMIA TYPE: ICD-10-CM

## 2019-11-22 PROCEDURE — 91110 GI TRC IMG INTRAL ESOPH-ILE: CPT | Performed by: INTERNAL MEDICINE

## 2020-01-08 DIAGNOSIS — M79.7 FIBROMYALGIA: ICD-10-CM

## 2020-01-08 RX ORDER — GABAPENTIN 100 MG/1
CAPSULE ORAL
Qty: 180 CAPSULE | Refills: 0 | Status: SHIPPED | OUTPATIENT
Start: 2020-01-08 | End: 2020-04-07

## 2020-01-17 DIAGNOSIS — K21.9 GASTROESOPHAGEAL REFLUX DISEASE WITHOUT ESOPHAGITIS: ICD-10-CM

## 2020-01-17 RX ORDER — OMEPRAZOLE 20 MG/1
20 CAPSULE, DELAYED RELEASE ORAL DAILY
Qty: 30 CAPSULE | Refills: 6 | Status: SHIPPED | OUTPATIENT
Start: 2020-01-17 | End: 2020-08-05

## 2020-01-17 NOTE — TELEPHONE ENCOUNTER
Teresa - Patient called refill omeprazole 20 mg delayed release capsule 1 capsule daily   Please call [facility] at [phone] ty

## 2020-04-06 ENCOUNTER — TELEPHONE (OUTPATIENT)
Dept: GASTROENTEROLOGY | Facility: CLINIC | Age: 60
End: 2020-04-06

## 2020-04-06 DIAGNOSIS — M79.7 FIBROMYALGIA: ICD-10-CM

## 2020-04-07 RX ORDER — GABAPENTIN 100 MG/1
CAPSULE ORAL
Qty: 180 CAPSULE | Refills: 3 | Status: SHIPPED | OUTPATIENT
Start: 2020-04-07 | End: 2020-06-09 | Stop reason: SDUPTHER

## 2020-05-06 ENCOUNTER — TELEMEDICINE (OUTPATIENT)
Dept: GASTROENTEROLOGY | Facility: CLINIC | Age: 60
End: 2020-05-06
Payer: COMMERCIAL

## 2020-05-06 DIAGNOSIS — D52.9 ANEMIA DUE TO FOLIC ACID DEFICIENCY, UNSPECIFIED DEFICIENCY TYPE: Primary | ICD-10-CM

## 2020-05-06 PROCEDURE — 99212 OFFICE O/P EST SF 10 MIN: CPT | Performed by: INTERNAL MEDICINE

## 2020-06-09 ENCOUNTER — OFFICE VISIT (OUTPATIENT)
Dept: NEUROLOGY | Facility: CLINIC | Age: 60
End: 2020-06-09
Payer: COMMERCIAL

## 2020-06-09 VITALS
HEIGHT: 60 IN | SYSTOLIC BLOOD PRESSURE: 120 MMHG | WEIGHT: 190.2 LBS | TEMPERATURE: 98.2 F | DIASTOLIC BLOOD PRESSURE: 70 MMHG | BODY MASS INDEX: 37.34 KG/M2 | HEART RATE: 80 BPM

## 2020-06-09 DIAGNOSIS — M79.7 FIBROMYALGIA: Primary | ICD-10-CM

## 2020-06-09 PROCEDURE — 99213 OFFICE O/P EST LOW 20 MIN: CPT | Performed by: PSYCHIATRY & NEUROLOGY

## 2020-06-09 RX ORDER — GABAPENTIN 100 MG/1
100 CAPSULE ORAL 2 TIMES DAILY
Qty: 180 CAPSULE | Refills: 3 | Status: SHIPPED | OUTPATIENT
Start: 2020-06-09 | End: 2020-12-28 | Stop reason: SDUPTHER

## 2020-07-29 ENCOUNTER — TELEPHONE (OUTPATIENT)
Dept: NEUROLOGY | Facility: CLINIC | Age: 60
End: 2020-07-29

## 2020-07-29 NOTE — TELEPHONE ENCOUNTER
Received a Attending Physician Statement form [facility] [address]  [facility] form was scanned into patient's chart

## 2020-07-29 NOTE — TELEPHONE ENCOUNTER
Enrique Chávez - attending physicians statement is filed under media  Are you agreeable to completing? Looks like this is for disability  Clerical - please collect fee for 1 page form

## 2020-08-03 NOTE — TELEPHONE ENCOUNTER
Yes Dr Van Rocha you have completed this before I will refer to this and give to you to review and sign off  Waiting for a better copy  The form in media prints too small

## 2020-08-05 DIAGNOSIS — K21.9 GASTROESOPHAGEAL REFLUX DISEASE WITHOUT ESOPHAGITIS: ICD-10-CM

## 2020-08-05 RX ORDER — OMEPRAZOLE 20 MG/1
CAPSULE, DELAYED RELEASE ORAL
Qty: 30 CAPSULE | Refills: 6 | Status: SHIPPED | OUTPATIENT
Start: 2020-08-05 | End: 2021-02-23

## 2020-08-05 NOTE — TELEPHONE ENCOUNTER
Received attending physicians statement form for Disability from the  MA to be filled out by Dr Jo Situ patient letting her know forms are ready to be picked up   Charges dropped     Patient will pass by tomorrow 08/06/2020 to paid for form fee      Please collect $15 00

## 2020-08-07 NOTE — TELEPHONE ENCOUNTER
Patient stopped by the office to pay Fee form of $15 00 for the Attending Physicians Statement form that was filled out by Dr Yg Ortega        Fee form collected    Copies scanned in chart

## 2020-12-28 ENCOUNTER — OFFICE VISIT (OUTPATIENT)
Dept: NEUROLOGY | Facility: CLINIC | Age: 60
End: 2020-12-28
Payer: COMMERCIAL

## 2020-12-28 VITALS
HEART RATE: 81 BPM | WEIGHT: 169 LBS | SYSTOLIC BLOOD PRESSURE: 112 MMHG | DIASTOLIC BLOOD PRESSURE: 60 MMHG | HEIGHT: 60 IN | BODY MASS INDEX: 33.18 KG/M2

## 2020-12-28 DIAGNOSIS — M79.7 FIBROMYALGIA: ICD-10-CM

## 2020-12-28 DIAGNOSIS — G50.9 TRIGEMINAL SENSORY LOSS: Primary | ICD-10-CM

## 2020-12-28 PROCEDURE — 99213 OFFICE O/P EST LOW 20 MIN: CPT | Performed by: PSYCHIATRY & NEUROLOGY

## 2020-12-28 RX ORDER — GABAPENTIN 100 MG/1
100 CAPSULE ORAL 2 TIMES DAILY
Qty: 180 CAPSULE | Refills: 3 | Status: SHIPPED | OUTPATIENT
Start: 2020-12-28 | End: 2022-02-15

## 2021-02-23 DIAGNOSIS — K21.9 GASTROESOPHAGEAL REFLUX DISEASE WITHOUT ESOPHAGITIS: ICD-10-CM

## 2021-02-23 RX ORDER — OMEPRAZOLE 20 MG/1
CAPSULE, DELAYED RELEASE ORAL
Qty: 30 CAPSULE | Refills: 6 | Status: SHIPPED | OUTPATIENT
Start: 2021-02-23 | End: 2021-09-16

## 2021-09-16 DIAGNOSIS — K21.9 GASTROESOPHAGEAL REFLUX DISEASE WITHOUT ESOPHAGITIS: ICD-10-CM

## 2021-09-16 RX ORDER — OMEPRAZOLE 20 MG/1
CAPSULE, DELAYED RELEASE ORAL
Qty: 30 CAPSULE | Refills: 6 | Status: SHIPPED | OUTPATIENT
Start: 2021-09-16 | End: 2022-05-11 | Stop reason: SDUPTHER

## 2021-09-24 ENCOUNTER — TELEPHONE (OUTPATIENT)
Dept: NEUROLOGY | Facility: CLINIC | Age: 61
End: 2021-09-24

## 2021-09-24 NOTE — TELEPHONE ENCOUNTER
Received by fax forms from [facility] requesting  Medical Information to support Disability  Did not drop charges did not collect form fee of $15 00  Forms was placed in Dr Aguilar Diss box for further evaluation  Waiting for Dr Carlos A Givens agree that he is able  to fill out forms  Form was scanned in chart

## 2021-09-27 NOTE — TELEPHONE ENCOUNTER
Please check if it is the same form that I filled for the last few years than it is okay to fill up that form

## 2021-10-04 ENCOUNTER — TELEPHONE (OUTPATIENT)
Dept: NEUROLOGY | Facility: CLINIC | Age: 61
End: 2021-10-04

## 2021-10-04 NOTE — TELEPHONE ENCOUNTER
Good Morning, Ally Alfred you know if the paper work for patient's  FMLA  Disability completed  Did not drop charges did not collect any $15 00  Papers needs to be sent back by 10/07/2021      Thank you

## 2021-10-26 NOTE — TELEPHONE ENCOUNTER
I faxed forms to [facility] and put into scann bin to go into patient chart

## 2022-02-21 ENCOUNTER — OFFICE VISIT (OUTPATIENT)
Dept: NEUROLOGY | Facility: CLINIC | Age: 62
End: 2022-02-21
Payer: COMMERCIAL

## 2022-02-21 VITALS
BODY MASS INDEX: 38.47 KG/M2 | DIASTOLIC BLOOD PRESSURE: 78 MMHG | SYSTOLIC BLOOD PRESSURE: 120 MMHG | WEIGHT: 197 LBS | OXYGEN SATURATION: 96 % | HEART RATE: 86 BPM

## 2022-02-21 DIAGNOSIS — M79.7 FIBROMYALGIA: Primary | ICD-10-CM

## 2022-02-21 DIAGNOSIS — S16.1XXD STRAIN OF NECK MUSCLE, SUBSEQUENT ENCOUNTER: ICD-10-CM

## 2022-02-21 PROCEDURE — 99214 OFFICE O/P EST MOD 30 MIN: CPT | Performed by: PSYCHIATRY & NEUROLOGY

## 2022-02-21 RX ORDER — GABAPENTIN 100 MG/1
100 CAPSULE ORAL 2 TIMES DAILY
Qty: 180 CAPSULE | Refills: 3 | Status: SHIPPED | OUTPATIENT
Start: 2022-02-21

## 2022-02-21 NOTE — PROGRESS NOTES
Progress Note - Neurology   Nemesio Hoffman 64 y o  female MRN: [id number]  Unit/Bed#:  Encounter: [id number]      Subjective:   Patient is here for a follow-up visit for chronic fibromyalgia, trigeminal neuralgia which is under control, and in the recent past for the past 2 once experiences intermittent pain in the left shoulder with paresthesias along the lateral aspect of the left arm  She claims the symptoms are transient, and generally resolve  Patient is on a home exercise program and denies any other new neurological symptoms  She remains on gabapentin 100 mg twice a day  Several years ago the patient also underwent cervical fusion and patient has joined [employer] classes in the recent past     ROS:   Review of Systems   Constitutional: Negative  Negative for appetite change and fever  HENT: Negative  Negative for hearing loss, tinnitus, trouble swallowing and voice change  Eyes: Negative  Negative for photophobia and pain  Respiratory: Negative  Negative for shortness of breath  Cardiovascular: Negative  Negative for palpitations  Gastrointestinal: Negative  Negative for nausea and vomiting  Endocrine: Negative  Negative for cold intolerance  Genitourinary: Negative  Negative for dysuria, frequency and urgency  Musculoskeletal: Positive for neck stiffness  Negative for myalgias and neck pain  Skin: Negative  Negative for rash  Neurological: Positive for numbness (Arms)  Negative for dizziness, tremors, seizures, syncope, facial asymmetry, speech difficulty, weakness, light-headedness and headaches  Hematological: Negative  Does not bruise/bleed easily  Psychiatric/Behavioral: Negative  Negative for confusion, hallucinations and sleep disturbance  MA review of systems was reviewed by myself      Vitals:   Vitals:    02/21/22 0834   BP: 120/78   BP Location: Left arm   Patient Position: Sitting   Cuff Size: Large   Pulse: 86   SpO2: 96%   Weight: 89 4 kg (197 lb)   ,Body mass index is 38 47 kg/m²  MEDS:      Current Outpatient Medications:     ALPHAGAN P 0 1 %, 2 (two) times a day , Disp: , Rfl:     aspirin 81 mg chewable tablet, Chew 81 mg daily, Disp: , Rfl:     Cholecalciferol (VITAMIN D3) 2000 units TABS, Take 2,000 Units by mouth 2 (two) times a day, Disp: , Rfl:     diltiazem (CARDIZEM CD) 180 mg 24 hr capsule, Take 180 mg by mouth daily, Disp: , Rfl:     gabapentin (NEURONTIN) 100 mg capsule, take 1 capsule by mouth twice a day, Disp: 180 capsule, Rfl: 1    hydroxychloroquine (PLAQUENIL) 200 mg tablet, Take 200 mg by mouth 2 (two) times a day, Disp: , Rfl:     omeprazole (PriLOSEC) 20 mg delayed release capsule, take 1 capsule by mouth once daily, Disp: 30 capsule, Rfl: 6    pilocarpine (SALAGEN) 5 mg tablet, Take 5 mg by mouth 2 (two) times a day, Disp: , Rfl:     Prenatal MV-Min-Fe Fum-FA-DHA (PRENATAL 1 PO), Take 1 tablet by mouth daily, Disp: , Rfl:     Wheat Dextrin (BENEFIBER DRINK MIX PO), Take by mouth Twice daily, Disp: , Rfl:     carboxymethylcellulose 1 % ophthalmic solution, Apply 1 drop to eye daily as needed, Disp: , Rfl:   :    Physical Exam:  General appearance: alert, appears stated age and cooperative  Head: Normocephalic, without obvious abnormality, atraumatic    On neurological examination patient is alert awake oriented, speech is fluent, cranial nerves 2-12 intact, and on motor and sensory exam there is no evidence of any focal weakness, drift in the upper extremities, deep tendon reflexes are preserved, and no sensory loss was noted  Patient has no evidence of any dysmetria and gait is normal based  She has mild cervical paraspinal tenderness as well as mild tenderness in the left shoulder at the insertion of the biceps tendon and range of motion is preserved  Lab Results: I have personally reviewed pertinent reports  Imaging Studies: I have personally reviewed pertinent reports  Assessment:  1   Chronic cervical strain, status post cervical fusion  2  Fibromyalgia  3  Trigeminal nerve dysfunction  Plan:  Patient is advised to continue gabapentin 100 mg twice a day which she may increase occasionally on a p r n  basis if her symptoms in the left arm worsen and she is also advised to continue home exercise program in spite of which if her symptoms worsen she is advised to call us may benefit from physical therapy to the cervical region and the left shoulder  Patient will return back to see us in 6 months       2/21/2022,8:38 AM    Dictation voice to text software has been used in the creation of this document  Please consider this in light of any contextual or grammatical errors

## 2022-05-11 ENCOUNTER — OFFICE VISIT (OUTPATIENT)
Dept: GASTROENTEROLOGY | Facility: CLINIC | Age: 62
End: 2022-05-11
Payer: COMMERCIAL

## 2022-05-11 VITALS
HEART RATE: 86 BPM | DIASTOLIC BLOOD PRESSURE: 70 MMHG | OXYGEN SATURATION: 98 % | SYSTOLIC BLOOD PRESSURE: 120 MMHG | HEIGHT: 60 IN | BODY MASS INDEX: 39.27 KG/M2 | WEIGHT: 200 LBS

## 2022-05-11 DIAGNOSIS — Z86.010 HISTORY OF COLON POLYPS: ICD-10-CM

## 2022-05-11 DIAGNOSIS — K21.9 GASTROESOPHAGEAL REFLUX DISEASE WITHOUT ESOPHAGITIS: Primary | ICD-10-CM

## 2022-05-11 PROCEDURE — 99213 OFFICE O/P EST LOW 20 MIN: CPT | Performed by: PHYSICIAN ASSISTANT

## 2022-05-11 RX ORDER — OMEPRAZOLE 20 MG/1
20 CAPSULE, DELAYED RELEASE ORAL DAILY
Qty: 30 CAPSULE | Refills: 3 | Status: SHIPPED | OUTPATIENT
Start: 2022-05-11

## 2022-05-11 RX ORDER — MELOXICAM 15 MG/1
15 TABLET ORAL DAILY
COMMUNITY
Start: 2022-04-14

## 2022-05-11 NOTE — PATIENT INSTRUCTIONS
Scheduled date of colonoscopy (as of today):6/23/22  Physician performing colonoscopy:Lul  Location of colonoscopy:[place]  Bowel prep reviewed with patient:miralax/dulcolax  Instructions reviewed with patient by:Padmini CALERO  Clearances:  none
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
## 2022-06-22 RX ORDER — SODIUM CHLORIDE, SODIUM LACTATE, POTASSIUM CHLORIDE, CALCIUM CHLORIDE 600; 310; 30; 20 MG/100ML; MG/100ML; MG/100ML; MG/100ML
125 INJECTION, SOLUTION INTRAVENOUS CONTINUOUS
Status: CANCELLED | OUTPATIENT
Start: 2022-06-22

## 2022-06-23 ENCOUNTER — ANESTHESIA EVENT (OUTPATIENT)
Dept: GASTROENTEROLOGY | Facility: HOSPITAL | Age: 62
End: 2022-06-23

## 2022-06-23 ENCOUNTER — HOSPITAL ENCOUNTER (OUTPATIENT)
Dept: GASTROENTEROLOGY | Facility: HOSPITAL | Age: 62
Setting detail: OUTPATIENT SURGERY
Discharge: HOME | End: 2022-06-23
Admitting: INTERNAL MEDICINE
Payer: COMMERCIAL

## 2022-06-23 ENCOUNTER — ANESTHESIA (OUTPATIENT)
Dept: GASTROENTEROLOGY | Facility: HOSPITAL | Age: 62
End: 2022-06-23

## 2022-06-23 VITALS
HEART RATE: 64 BPM | OXYGEN SATURATION: 100 % | SYSTOLIC BLOOD PRESSURE: 116 MMHG | DIASTOLIC BLOOD PRESSURE: 64 MMHG | RESPIRATION RATE: 21 BRPM | HEIGHT: 60 IN | WEIGHT: 195.11 LBS | TEMPERATURE: 97.7 F | BODY MASS INDEX: 38.31 KG/M2

## 2022-06-23 DIAGNOSIS — Z86.010 HISTORY OF COLON POLYPS: ICD-10-CM

## 2022-06-23 PROCEDURE — 45385 COLONOSCOPY W/LESION REMOVAL: CPT | Performed by: INTERNAL MEDICINE

## 2022-06-23 PROCEDURE — 88305 TISSUE EXAM BY PATHOLOGIST: CPT | Performed by: PATHOLOGY

## 2022-06-23 RX ORDER — PROPOFOL 10 MG/ML
INJECTION, EMULSION INTRAVENOUS AS NEEDED
Status: DISCONTINUED | OUTPATIENT
Start: 2022-06-23 | End: 2022-06-23

## 2022-06-23 RX ORDER — SODIUM CHLORIDE, SODIUM LACTATE, POTASSIUM CHLORIDE, CALCIUM CHLORIDE 600; 310; 30; 20 MG/100ML; MG/100ML; MG/100ML; MG/100ML
125 INJECTION, SOLUTION INTRAVENOUS CONTINUOUS
Status: DISCONTINUED | OUTPATIENT
Start: 2022-06-23 | End: 2022-06-27 | Stop reason: HOSPADM

## 2022-06-23 RX ORDER — LIDOCAINE HYDROCHLORIDE 10 MG/ML
INJECTION, SOLUTION EPIDURAL; INFILTRATION; INTRACAUDAL; PERINEURAL AS NEEDED
Status: DISCONTINUED | OUTPATIENT
Start: 2022-06-23 | End: 2022-06-23

## 2022-06-23 RX ADMIN — SODIUM CHLORIDE, SODIUM LACTATE, POTASSIUM CHLORIDE, AND CALCIUM CHLORIDE 125 ML/HR: .6; .31; .03; .02 INJECTION, SOLUTION INTRAVENOUS at 08:17

## 2022-06-23 RX ADMIN — PROPOFOL 120 MG: 10 INJECTION, EMULSION INTRAVENOUS at 09:50

## 2022-06-23 RX ADMIN — PROPOFOL 30 MG: 10 INJECTION, EMULSION INTRAVENOUS at 09:56

## 2022-06-23 RX ADMIN — PROPOFOL 50 MG: 10 INJECTION, EMULSION INTRAVENOUS at 09:53

## 2022-06-23 RX ADMIN — LIDOCAINE HYDROCHLORIDE 50 MG: 10 INJECTION, SOLUTION EPIDURAL; INFILTRATION; INTRACAUDAL at 09:50
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
## 2022-06-23 NOTE — ANESTHESIA POSTPROCEDURE EVALUATION
Post-Op Assessment Note    CV Status:  Stable  Pain Score: 0    Pain management: adequate     Mental Status:  Alert and awake   Hydration Status:  Euvolemic   PONV Controlled:  Controlled   Airway Patency:  Patent and adequate      Post Op Vitals Reviewed: Yes      Staff: CRNA         No complications documented      BP   126/56   Temp      Pulse  70   Resp 18   SpO2 100
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
## 2022-07-13 ENCOUNTER — TELEPHONE (OUTPATIENT)
Dept: GASTROENTEROLOGY | Facility: CLINIC | Age: 62
End: 2022-07-13

## 2022-07-13 NOTE — TELEPHONE ENCOUNTER
----- Message from Dong Hinton PA-C sent at 7/13/2022  2:47 PM EDT -----  Please inform patient that both polyps removed from her colonoscopy were completely benign  She will need a repeat colonoscopy in 5 years    Thank you

## 2022-07-13 NOTE — TELEPHONE ENCOUNTER
Called and LMOM with results-both polyps were removed from her colon and are completely benign  She needs a repeat colonoscopy in 5 years

## 2022-08-17 ENCOUNTER — TELEPHONE (OUTPATIENT)
Dept: NEUROLOGY | Facility: CLINIC | Age: 62
End: 2022-08-17

## 2022-08-17 NOTE — TELEPHONE ENCOUNTER
Unable to Leave message to confirm appt for 8/19/22 at 8am  with [facility] in Neurology

## 2022-08-18 NOTE — TELEPHONE ENCOUNTER
Contacted patient after she left a message w/ Radiology  Let patient know we were calling to confirm her appt with Rach per her MA   The patient stated her appt was on Monday 08/22/2 at 8am not tomorrow 08/19/22 at 8am      Confirmed/verfieid patient's appt w/ Rach on 08/22/22 at 8am

## 2022-08-22 ENCOUNTER — OFFICE VISIT (OUTPATIENT)
Dept: NEUROLOGY | Facility: CLINIC | Age: 62
End: 2022-08-22
Payer: COMMERCIAL

## 2022-08-22 VITALS
WEIGHT: 190 LBS | OXYGEN SATURATION: 92 % | RESPIRATION RATE: 17 BRPM | SYSTOLIC BLOOD PRESSURE: 116 MMHG | HEIGHT: 60 IN | DIASTOLIC BLOOD PRESSURE: 68 MMHG | BODY MASS INDEX: 37.3 KG/M2 | HEART RATE: 89 BPM

## 2022-08-22 DIAGNOSIS — G50.0 TRIGEMINAL NEURALGIA: Primary | ICD-10-CM

## 2022-08-22 DIAGNOSIS — M79.7 FIBROMYALGIA: ICD-10-CM

## 2022-08-22 PROBLEM — D50.9 IRON DEFICIENCY ANEMIA: Status: ACTIVE | Noted: 2020-01-09

## 2022-08-22 PROCEDURE — 99215 OFFICE O/P EST HI 40 MIN: CPT

## 2022-08-22 NOTE — ASSESSMENT & PLAN NOTE
Reilly Kebede is a 64year old female who is known to the practice for chronic fibromyalgia and trigeminal neuralgia  She also follows with Rhematologist Dr Roseann Villela for fibromyalgia, Sjogren's disease and Lupus  She is treated with plaquenil and pilocarpine  She is still having left trapezius/sub scapular pain described as intermittent achiness  She uses heat, ice, and tylenol or ibuprofen as needed when this flares up  On exam there is mild tenderness on palpation of the left trapezius and upper scapula area, with no radicular pain noted  I discussed the importance of routine stretching for musculoskeletal tension and pain  I suggested she routinely complete 10 minutes of heat with a warm compress or heating pad followed by 15 minutes of massage or stretching daily  We discussed she may continue to use tylenol or ibuprofen as needed in addition to her gabapentin which is also likely providing some relief         Plan:  - Apply heat for 10 minutes daily  - Use massage and stretching for 15 minutes daily  - Stay as physically active as possible  - Continue ibuprofen/tylenol as needed  - Continue Gabapentin 100 mg up to three times daily

## 2022-08-22 NOTE — PATIENT INSTRUCTIONS
- Apply heat for 10 minutes daily  - Use massage and stretching for 15 minutes daily  - Stay as physically active as possible  - Continue ibuprofen/tylenol as needed  - Continue Gabapentin 100 mg up to three times daily as needed   - Follow up in 6 months; sooner if needed

## 2022-08-22 NOTE — ASSESSMENT & PLAN NOTE
Yarely Cortez is a 64year old female with a hx of trigeminal neuralgia, well controlled on Gabapentin 100 mg bid, with an occasional 3rd dose when needed  She denies any adverse effects from gabapentin, and denies any recent flares of her TN pain  Her exam is normal, with no sensory disturbance in the V1-V3 distributions of her face on either side      Plan:  - Continue Gabapentin 100 mg up to three times daily as needed   - Follow up in 6 months; sooner if needed

## 2022-08-22 NOTE — PROGRESS NOTES
Patient ID: Ping Schafer is a 64 y o  female  Assessment/Plan:    Trigeminal neuralgia  Ping Schafer is a 64year old female with a hx of trigeminal neuralgia, well controlled on Gabapentin 100 mg bid, with an occasional 3rd dose when needed  She denies any adverse effects from gabapentin, and denies any recent flares of her TN pain  Her exam is normal, with no sensory disturbance in the V1-V3 distributions of her face on either side  Plan:  - Continue Gabapentin 100 mg up to three times daily as needed   - Follow up in 6 months; sooner if needed     Fibromyalgia  Ping Schafer is a 64year old female who is known to the practice for chronic fibromyalgia and trigeminal neuralgia  She also follows with Rhematologist Dr Endy Puckett for fibromyalgia, Sjogren's disease and Lupus  She is treated with plaquenil and pilocarpine  She is still having left trapezius/sub scapular pain described as intermittent achiness  She uses heat, ice, and tylenol or ibuprofen as needed when this flares up  On exam there is mild tenderness on palpation of the left trapezius and upper scapula area, with no radicular pain noted  I discussed the importance of routine stretching for musculoskeletal tension and pain  I suggested she routinely complete 10 minutes of heat with a warm compress or heating pad followed by 15 minutes of massage or stretching daily  We discussed she may continue to use tylenol or ibuprofen as needed in addition to her gabapentin which is also likely providing some relief         Plan:  - Apply heat for 10 minutes daily  - Use massage and stretching for 15 minutes daily  - Stay as physically active as possible  - Continue ibuprofen/tylenol as needed  - Continue Gabapentin 100 mg up to three times daily         Diagnoses and all orders for this visit:    Trigeminal neuralgia    Fibromyalgia         I have spent 40 minutes in face to face time and chart review with this patient today     Subjective:    HUNTER Correa is a 64year old female who is known to the practice for chronic fibromyalgia and trigeminal neuralgia  At baseline she is maintained on Gabapentin 100 mg up to 3 times daily, average use is twice daily  She was last seen by Dr Emily Maldonado 2/21/22 and also follows with her Rhematologist Dr Douglas Baker for fibromyalgia, Sjogren's disease and Lupus  She is treated with plaquenil and pilocarpine  She is also known to have open angle glaucoma and follows closely with ophthalmology  She is seen by [facility] for plantar fascitis and chronic shoulder pain  She denies any recent flares of her trigeminal neuralgia  She is able to eat, speak and brush her teeth without difficulty  She tolerates Gabapentin 100 mg up to three times daily without any adverse effects  She is still having left trapezius/sub scapular pain described as intermittent achiness  She uses heat, ice, and tylenol or ibuprofen as needed when this flares up  She associates this with her Fibromyalgia and hx of cervical fusion  She also precipitates in a home exercise program, stretching, walking, and would like to get back into boxing  She denies any new numbness or tingling (at baseline due to arthritis she does occasionally have some in her fingers), any new weakness, difficulty speaking or swallowing, or facial pain or headaches  She denies any new neurologic complaints today  The following portions of the patient's history were reviewed and updated as appropriate: allergies, current medications, past family history, past medical history, past social history and past surgical history  Objective:    Blood pressure 116/68, pulse 89, resp  rate 17, height 5' (1 524 m), weight 86 2 kg (190 lb), SpO2 92 %, not currently breastfeeding  Neurological Exam    On neurological examination patient is alert, awake, oriented and in no distress   Speech is fluent without dysarthria or aphasia  Cranial nerves 2-12 were symmetrically intact bilaterally  No evidence of any focal weakness or sensory loss in the upper or lower extremities  Motor testing reveals 5/5 strength of the bilateral upper and lower extremities  There was no pronator drift  No fasciculations present  No abnormal involuntary movements  Finger- to-nose reveals no tremor or ataxia and intact proprioceptive function, no dysmetria was noted  Sensation was intact to vibration, light touch, and temperature in bilateral upper and lower extremities  Deep tendon reflexes were 2+ and symmetric in the bilateral upper and lower extremities  She is able to rise easily without assistance from a seated position  Casual gait is normal including stance, stride, and arm swing  Normal tandem gait  Romberg is absent  There is mild tenderness on palpation of the left trapezius and upper scapula, with no radicular pain noted  ROS:    Review of Systems   Constitutional: Negative  Negative for appetite change and fever  HENT: Negative  Negative for hearing loss, tinnitus, trouble swallowing and voice change  Eyes: Negative  Negative for photophobia and pain  Respiratory: Negative  Negative for shortness of breath  Cardiovascular: Negative  Negative for palpitations  Gastrointestinal: Negative  Negative for nausea and vomiting  Endocrine: Negative  Negative for cold intolerance  Genitourinary: Negative  Negative for dysuria, frequency and urgency  Musculoskeletal: Positive for back pain and myalgias  Negative for neck pain  Patient states daily muscles pain and aches  Patient states she is seeing a orthopedic for back pain and shoulder pain  Skin: Negative  Negative for rash  Neurological: Positive for weakness  Negative for dizziness, tremors, seizures, syncope, facial asymmetry, speech difficulty, light-headedness, numbness and headaches  Patient states bilateral hand weakness      Hematological: Negative  Does not bruise/bleed easily  Psychiatric/Behavioral: Negative  Negative for confusion, hallucinations and sleep disturbance  Reviewed ROS as entered by medical assistant

## 2022-09-28 ENCOUNTER — TELEPHONE (OUTPATIENT)
Dept: NEUROLOGY | Facility: CLINIC | Age: 62
End: 2022-09-28

## 2022-09-28 NOTE — TELEPHONE ENCOUNTER
Hello Pt asked if her last encounter notes can be ready in an envolope so she can pick them up  Per insurance purposes she said  Also to call her when ready at [phone]      Thank you in advance,     Sd Marley

## 2022-09-29 NOTE — TELEPHONE ENCOUNTER
Printed encounter notes from 08/22/22 keiry Loyd  Contacted patient to let her know the notes were ready for pick-up at the [facility]  Patient stated she would be coming by either today (09/29/22) or tomorrow (09/30/22)

## 2023-03-24 ENCOUNTER — OFFICE VISIT (OUTPATIENT)
Dept: NEUROLOGY | Facility: CLINIC | Age: 63
End: 2023-03-24

## 2023-03-24 VITALS
HEIGHT: 60 IN | DIASTOLIC BLOOD PRESSURE: 59 MMHG | BODY MASS INDEX: 39.11 KG/M2 | SYSTOLIC BLOOD PRESSURE: 111 MMHG | WEIGHT: 199.2 LBS | HEART RATE: 82 BPM

## 2023-03-24 DIAGNOSIS — G50.0 TRIGEMINAL NEURALGIA: Primary | ICD-10-CM

## 2023-03-24 DIAGNOSIS — F32.A ANXIETY AND DEPRESSION: ICD-10-CM

## 2023-03-24 DIAGNOSIS — F41.9 ANXIETY AND DEPRESSION: ICD-10-CM

## 2023-03-24 RX ORDER — ESCITALOPRAM OXALATE 5 MG/1
5 TABLET ORAL DAILY
Qty: 30 TABLET | Refills: 0 | Status: SHIPPED | OUTPATIENT
Start: 2023-03-24

## 2023-03-24 NOTE — ASSESSMENT & PLAN NOTE
She does feel she has recently been under more stress  She describes easy crying, feeling overwhelmed, and lack of motivation  She does feel that she has both anxiety and depression  Recently worse with her  having surgery and she has concerns that he may also be developing dementia  He is resistant to being evaluated  She denies SI/HI  We discussed options for treatment of Anxiety and Depression including increasing Gabapentin vs Lexapro vs Amitriptyline  She states she did not tolerate higher doses of Gabapentin  She also reports a prior hx of "unknown arrhythmia", EKG reviewed from 1 yr ago does not show QT prolongation  She would prefer to try Lexapro  We discussed common adverse effects to monitor for  We will start with a very low dose of 5 mg and increase to 10 mg in 1 month if needed  Otherwise she can plan to continue on 5 mg  If anxiety or depression is not well controlled with this change, will defer further treatment to PCP vs Psychiatry       Plan:  - Continue Gabapentin 100 mg up to 3 times a day  - Start Lexapro 5 mg daily; send a message in 3 weeks with an update on how you are feeling and we could consider increasing to 10 mg if needed  -Follow up 6 months; sooner if needed

## 2023-03-24 NOTE — PATIENT INSTRUCTIONS
- Continue Gabapentin 100 mg up to 3 times a day  - Start Lexapro 5 mg daily; send a message in 3 weeks with an update on how you are feeling and we could consider increasing to 10 mg if needed  -Follow up 6 months; sooner if needed

## 2023-03-24 NOTE — ASSESSMENT & PLAN NOTE
Juan F Melgar is a 58year old female with a hx of trigeminal neuralgia, well controlled on Gabapentin 100 mg bid, with an occasional 3rd dose when needed  She denies any adverse effects from gabapentin, and denies any recent flares of her TN pain  Her exam is normal, with no sensory disturbance in the V1-V3 distributions of her face on either side      Plan:  - Continue Gabapentin 100 mg up to three times daily as needed   - Follow up in 6 months; sooner if needed

## 2023-03-24 NOTE — PROGRESS NOTES
Patient ID: Renae Willis is a 58 y o  female  Assessment/Plan:    Trigeminal neuralgia  Renae Willis is a 58year old female with a hx of trigeminal neuralgia, well controlled on Gabapentin 100 mg bid, with an occasional 3rd dose when needed  She denies any adverse effects from gabapentin, and denies any recent flares of her TN pain  Her exam is normal, with no sensory disturbance in the V1-V3 distributions of her face on either side  Plan:  - Continue Gabapentin 100 mg up to three times daily as needed   - Follow up in 6 months; sooner if needed     Anxiety and depression  She does feel she has recently been under more stress  She describes easy crying, feeling overwhelmed, and lack of motivation  She does feel that she has both anxiety and depression  Recently worse with her  having surgery and she has concerns that he may also be developing dementia  He is resistant to being evaluated  She denies SI/HI  We discussed options for treatment of Anxiety and Depression including increasing Gabapentin vs Lexapro vs Amitriptyline  She states she did not tolerate higher doses of Gabapentin  She also reports a prior hx of "unknown arrhythmia", EKG reviewed from 1 yr ago does not show QT prolongation  She would prefer to try Lexapro  We discussed common adverse effects to monitor for  We will start with a very low dose of 5 mg and increase to 10 mg in 1 month if needed  Otherwise she can plan to continue on 5 mg  If anxiety or depression is not well controlled with this change, will defer further treatment to PCP vs Psychiatry       Plan:  - Continue Gabapentin 100 mg up to 3 times a day  - Start Lexapro 5 mg daily; send a message in 3 weeks with an update on how you are feeling and we could consider increasing to 10 mg if needed  -Follow up 6 months; sooner if needed        Diagnoses and all orders for this visit:    Trigeminal neuralgia    Anxiety and depression  -     escitalopram (LEXAPRO) 5 mg tablet; Take 1 tablet (5 mg total) by mouth daily           I have spent a total time of 30minutes on 03/24/23 in caring for this patient including Risks and benefits of tx options, Instructions for management, Impressions, Counseling / Coordination of care, Documenting in the medical record, Reviewing / ordering tests, medicine, procedures   and Obtaining or reviewing history    Subjective:    HUNTER Correa is a 58year old female who is known to the practice for chronic fibromyalgia and trigeminal neuralgia  At baseline she is maintained on Gabapentin 100 mg up to 3 times daily, average use is twice daily  She was last seen 8/22/22 and also follows with her Rhematologist Dr Douglas Baker for fibromyalgia, Sjogren's disease and Lupus  She is treated with plaquenil and pilocarpine  She is also known to have open angle glaucoma and follows closely with ophthalmology  She is seen by [facility] for plantar fascitis and chronic shoulder pain  She is currently undergoing a "Prescribed Fit Program" and has noted an improvement in her plantar fascitis  She is now walking 4 miles 4 times per week      She denies any recent flares of her trigeminal neuralgia  She states it continues to be mostly right sided, on average occurring once a week  On average it lasts 30 seconds or longer, described as an electrical TENS unit type feeling  She has noticed it is definitely exacerbated by stress  She is able to eat, speak and brush her teeth without difficulty  She tolerates Gabapentin 100 mg up to three times daily without any adverse effects  She does feel she has recently been under more stress  She describes easy crying, feeling overwhelmed, and lack of motivation  She does feel that she has both anxiety and depression  Recently worse with her  having surgery and she has concerns that he may also be developing dementia  He is resistant to being evaluated  She denies SI/HI        She denies any new numbness or tingling, weakness, difficulty speaking or swallowing, or facial pain or headaches       She denies any new neurologic complaints today  The following portions of the patient's history were reviewed and updated as appropriate: allergies, current medications, past family history, past medical history, past social history and past surgical history  Objective:    Blood pressure 111/59, pulse 82, height 5' (1 524 m), weight 90 4 kg (199 lb 3 2 oz), not currently breastfeeding  Neurological Exam     On neurological examination patient is alert, awake, oriented and in no distress  Speech is fluent without dysarthria or aphasia  Cranial nerves 2-12 were symmetrically intact bilaterally  No evidence of any focal weakness or sensory loss in the upper or lower extremities  She is able to rise easily without assistance from a seated position  Casual gait is normal including stance, stride, and arm swing  ROS:    Review of Systems   Constitutional: Negative  Negative for appetite change and fever  HENT: Negative  Negative for hearing loss, tinnitus, trouble swallowing and voice change  Eyes: Negative  Negative for photophobia, pain and visual disturbance  Respiratory: Negative  Negative for shortness of breath  Cardiovascular: Negative  Negative for palpitations  Gastrointestinal: Negative  Negative for nausea and vomiting  Endocrine: Negative  Negative for cold intolerance  Genitourinary: Negative  Negative for dysuria, frequency and urgency  Musculoskeletal: Negative  Negative for gait problem, myalgias and neck pain  Skin: Negative  Negative for rash  Allergic/Immunologic: Negative  Neurological: Negative  Negative for dizziness, tremors, seizures, syncope, facial asymmetry, speech difficulty, weakness, light-headedness, numbness and headaches  Hematological: Negative  Does not bruise/bleed easily  Psychiatric/Behavioral: Negative    Negative for confusion, hallucinations and sleep disturbance  All other systems reviewed and are negative  Reviewed ROS as entered by medical assistant

## 2023-06-14 DIAGNOSIS — M79.7 FIBROMYALGIA: ICD-10-CM

## 2023-06-14 RX ORDER — GABAPENTIN 100 MG/1
100 CAPSULE ORAL 3 TIMES DAILY
Qty: 270 CAPSULE | Refills: 3 | Status: SHIPPED | OUTPATIENT
Start: 2023-06-14 | End: 2024-08-06

## 2023-06-14 NOTE — TELEPHONE ENCOUNTER
VM left on 6/13/23 at 3:48 pm, picked up on 6/14 at 9:02 am:    Patient needs a refill on her Gabapentin 100 mg capsules BID for a 90 day supply. Please send to [facility]# [phone]      LOV 3/24/23, next appt is 9/28/23. Please refill if agreeable.

## 2023-08-22 ENCOUNTER — TELEPHONE (OUTPATIENT)
Dept: NEUROLOGY | Facility: CLINIC | Age: 63
End: 2023-08-22

## 2023-08-22 NOTE — TELEPHONE ENCOUNTER
LMOM informing that upcoming appt with Rach will be a virtual as she will not be in the office. Provided call back number for questions.

## 2023-09-28 ENCOUNTER — TELEMEDICINE (OUTPATIENT)
Dept: NEUROLOGY | Facility: CLINIC | Age: 63
End: 2023-09-28
Payer: COMMERCIAL

## 2023-09-28 DIAGNOSIS — F41.9 ANXIETY AND DEPRESSION: ICD-10-CM

## 2023-09-28 DIAGNOSIS — M25.551 ACUTE HIP PAIN, BILATERAL: ICD-10-CM

## 2023-09-28 DIAGNOSIS — F32.A ANXIETY AND DEPRESSION: ICD-10-CM

## 2023-09-28 DIAGNOSIS — G50.0 TRIGEMINAL NEURALGIA: Primary | ICD-10-CM

## 2023-09-28 DIAGNOSIS — M25.552 ACUTE HIP PAIN, BILATERAL: ICD-10-CM

## 2023-09-28 PROCEDURE — 99213 OFFICE O/P EST LOW 20 MIN: CPT

## 2023-09-28 RX ORDER — DIPHENOXYLATE HYDROCHLORIDE AND ATROPINE SULFATE 2.5; .025 MG/1; MG/1
1 TABLET ORAL DAILY
COMMUNITY

## 2023-09-28 RX ORDER — ESCITALOPRAM OXALATE 10 MG/1
10 TABLET ORAL DAILY
Qty: 90 TABLET | Refills: 3 | Status: SHIPPED | OUTPATIENT
Start: 2023-09-28

## 2023-09-28 NOTE — ASSESSMENT & PLAN NOTE
Aretha Perales is a 61year old female with a hx of trigeminal neuralgia, usually well controlled on Gabapentin 100 mg bid, with an occasional 3rd dose when needed. She denies any adverse effects from gabapentin. She recently has noted more right V3 numbness (but denies pain at this time) due to recent TMJ dysfunction.     Plan:  - Increase Gabapentin to 100 mg tid consistently  - Increase Lexapro from 5 mg to 10 mg daily; new rx sent  - Complete bilateral hip xrays; then follow up with orthopedic medicine  - Follow up in 1 month (in person) for new complaints: optic migraines and lower extremity weakness

## 2023-09-28 NOTE — ASSESSMENT & PLAN NOTE
She continues on Lexapro 5 mg and has found this to be helpful. She denies any adverse effects. However she does still feel overwhelmed and stress and states this exacerbates her anxiety. She is interested in increasing Lexapro further.     Plan:  Increase Lexapro from 5 mg to 10 mg daily  Monitor for any adverse effects

## 2023-09-28 NOTE — PATIENT INSTRUCTIONS
- Continue Gabapentin 100 mg tid  - Increase Lexapro from 5 mg to 10 mg daily; new rx sent  - Complete bilateral hip xrays; then follow up with orthopedic medicine  - Follow up in 1 month (in person) for new complaints: optic migraines and lower extremity weakness

## 2023-09-28 NOTE — ASSESSMENT & PLAN NOTE
Amaris Chang c/o acute bilateral hip pain  This could be related to Fibromyalgia vs Lumbar DDD vs possible hip bursitis  Will evaluate further with bilateral hip xrays  I have encouraged her to follow up with Orthopedic medicine.
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
## 2023-09-29 ENCOUNTER — APPOINTMENT (OUTPATIENT)
Dept: RADIOLOGY | Facility: CLINIC | Age: 63
End: 2023-09-29
Payer: COMMERCIAL

## 2023-09-29 DIAGNOSIS — M25.551 ACUTE HIP PAIN, BILATERAL: ICD-10-CM

## 2023-09-29 DIAGNOSIS — M25.552 ACUTE HIP PAIN, BILATERAL: ICD-10-CM

## 2023-09-29 PROCEDURE — 73521 X-RAY EXAM HIPS BI 2 VIEWS: CPT

## 2023-10-18 ENCOUNTER — TELEPHONE (OUTPATIENT)
Dept: NEUROLOGY | Facility: CLINIC | Age: 63
End: 2023-10-18

## 2023-10-18 NOTE — TELEPHONE ENCOUNTER
Received VM transcription from 10/17/23, 10:38 AM:    LOVE Mckeon. Phone number [phone]. Calling to speak to the Dr. Duarte Dawn regarding patient Ashwini Reece. YOB: 1960. I am doing a disability evaluation on her and was asked to call to speak to her about it. She can call me back. Thank you.

## 2023-10-18 NOTE — TELEPHONE ENCOUNTER
Called pt, no answer. LVM advising pt to call back and let us know if okay for neurology provider to speak on pt's behalf. Will call pt again at later time.

## 2023-10-18 NOTE — TELEPHONE ENCOUNTER
Pt called in she said it is ok for you to speak to Dr. Brenda Kinney. He is an [employer] for disability and she needs Rach to speak to him. Thank you.

## 2023-10-18 NOTE — TELEPHONE ENCOUNTER
Called Dr. Christ Rodriguez back he was questioning what we are treating patient for. Reviewed diagnoses in last office visit. He was also wanting to know patient's ability to work, I advised that I did not take patient out of work and do not determine work ability or perform these assessments. He was appreciative of the information. He may request my records for review if needed.

## 2023-10-24 ENCOUNTER — OFFICE VISIT (OUTPATIENT)
Dept: NEUROLOGY | Facility: CLINIC | Age: 63
End: 2023-10-24
Payer: COMMERCIAL

## 2023-10-24 VITALS
HEIGHT: 60 IN | WEIGHT: 163.8 LBS | DIASTOLIC BLOOD PRESSURE: 64 MMHG | HEART RATE: 77 BPM | SYSTOLIC BLOOD PRESSURE: 110 MMHG | BODY MASS INDEX: 32.16 KG/M2

## 2023-10-24 DIAGNOSIS — H53.9 VISUAL DISTURBANCE: Primary | ICD-10-CM

## 2023-10-24 DIAGNOSIS — M51.36 DEGENERATIVE DISC DISEASE, LUMBAR: ICD-10-CM

## 2023-10-24 DIAGNOSIS — R15.9 BOWEL INCONTINENCE: ICD-10-CM

## 2023-10-24 PROBLEM — M51.369 DEGENERATIVE DISC DISEASE, LUMBAR: Status: ACTIVE | Noted: 2023-10-24

## 2023-10-24 PROCEDURE — 99215 OFFICE O/P EST HI 40 MIN: CPT

## 2023-10-24 NOTE — PROGRESS NOTES
Patient ID: Arleth Smith is a 61 y.o. female. Assessment/Plan:    Visual disturbance  Visual disturbance described as squiggly lines and/or kaleidoscope vision occurring very briefly and intermittently since June 2023. Differentials include thyroid dysfunction as evidenced by recent TSH vs Ocular migraines vs related to known Sjogrens disease vs vascular phenomena such as stroke or inflammatory condition such as arteritis, or optic neuritis although less likely given intermittent nature of symptoms with no associated pain, loss of vision, or other accompanying symptom. I encouraged her to follow up with her PCP regarding her thyroid function. We will proceed with a MRI Brain and orbits wwo with labs prior to evaluate for any stroke, mass, hemorrhage, optic neuritis, or arteritis. She should continue with routine follow ups with Ophthalmology. Degenerative disc disease, lumbar  Known lumbar DDD; last MRI 11/2015  She c/o progressive lower extremity weakness and low back pain. As well as new symptom of occasional bowel incontinence   She already follows with [facility] orthopedics. Will order STAT MRI Lumbar spine now but emphasized the importance of follow up with orthopedic medicine. Also reviewed red flags and reasons to go to the ER. Plan:  - Complete MRI Lumbar spine STAT  - Continue Gabapentin three times a day  - Follow up with Orthopedic medicine re: back/hips  - Discussed red flags and reasons to go to the ER (progressive weakness, numbness, tingling, loss of bowel/bladder function, saddle anesthesia)      Bowel incontinence  She reports new onset of bowel incontinence x 2-3 months - when she sits to urinate she passes a soft bowel movement. Once happened in the car as well without warning or the sensation to go. She has a hx of SBO 3 years ago. She follows with GI had a recent conoloscopy which was normal.   She denies any recent falls or traumas.     Will evaluate with STAT MRI Lumbar spine. If negative for cord compression, discussed the need for follow up with GI to evaluate for other causes. Diagnoses and all orders for this visit:    Visual disturbance  -     MRI brain and orbits wo and w contrast; Future  -     BUN; Future  -     Creatinine, serum; Future  -     C-reactive protein; Future  -     Sedimentation rate, automated; Future    Degenerative disc disease, lumbar  -     MRI lumbar spine without contrast; Future  -     BUN; Future  -     Creatinine, serum; Future    Bowel incontinence  -     MRI lumbar spine without contrast; Future  -     BUN; Future  -     Creatinine, serum; Future       I have spent a total time of 45 minutes on 10/24/23 in caring for this patient including Diagnostic results, Prognosis, Risks and benefits of tx options, Instructions for management, Patient and family education, Importance of tx compliance, Risk factor reductions, Impressions, Documenting in the medical record, Reviewing / ordering tests, medicine, procedures  , and Obtaining or reviewing history  . Subjective:    HUNTER Nguyen is a 61 y.o. female who is known to the practice for chronic fibromyalgia and trigeminal neuralgia. At baseline she is maintained on Gabapentin 100 mg up to 3 times daily, average use is twice daily. She was last seen 9/28/23 and also follows with her Rhematologist Dr. Tabitha Jiang for fibromyalgia, Sjogren's disease and Lupus. She is treated with plaquenil and pilocarpine. She is also known to have open angle glaucoma and follows closely with ophthalmology. She is seen by [facility] for plantar fascitis and chronic shoulder pain. She is also maintained on Lexapro 10 mg for anxiety and depression. She returns in close follow up today to discuss new symptoms of optic migraines and lower extremity weakness.     Visual Disturbance  She reports intermittent, brief squiggly lines in her vision and occasional kaleidoscope vision (can be just the right eye or both eyes together) present since June 2023. She states she spoke to Ophthalmology about this and they suggested she had optic migraines. She is treated with plaquenil and pilocarpine and has frequent eye exams. She also has Sjogrens disease. She states she notices them a couple of times a week but then other times not at all for weeks at a time. She states the duration is 1-45 seconds, always less than 1 minute. She denies any associated numbness, tingling, weakness, difficulty with speech, dizziness, or headache. She states once it resolves, she has no residual deficits. She states her recent Ophthalmologic exam and visual field exam was stable, although she is being monitored for possible macular degeneration in the left eye. Last MRI Brain was 6/4/15. She reports a hx of TIA 24 years ago. She continues on ASA 81 mg. She is a prior smoker, quit 35 years ago. Recent labs done last week (reviewed on her phone, since they were completed at [address] [facility] and not available in [facility] today): LDL 92, with abnormal TSH <0.01- not currently treated. Anxiety/Depression   Since increasing Lexapro to 10 mg her anxiety is better although she continues to be under a lot of stress. Her  is having cataract surgery before and after thanksgiving and cognitively is declining. Her daughter may have to deliver prematurely at 29 weeks    Back/Lower Extremity pain/weakness  She reports lower extremity weakness although feels this could be related to her hip pain.    She feels her hips feel stiffer lately  She has had no falls  Left knee feels weak at times and states she has known OA in the knee  She notes she has to go up/down stairs one at a time due to hip pain  She reports pain in both hips and low back  She does follow with Orthopedic medicine for her shoulder/knee  She is due to see Rheumatology after the new year- She is treated with plaquenil and pilocarpine  She reports purposefully loosing weight; now down to 163 lbs  Walking 4 miles 4 days a week  She states she did not tolerate higher doses of Gabapentin. She also reports a prior hx of "unknown arrhythmia", EKG reviewed from 1 yr ago does not show QT prolongation. She reports new onset of bowel incontinence x 2-3 months - when she sits to urinate she passes a soft bowel movement. Once happened in the car as well without warning or the sensation to go. She has a hx of SBO 3 years ago. She follows with GI had a recent conoloscopy which was normal.   She denies any recent falls or traumas. She reports waiting to see Orthopedic medicine, as she wants to see a specific provider who was not available for an ASAP appointment. For now she continues on Gabapentin 100 mg tid. MRI Lumbar spine 11/19/2015  Persistent multilevel degenerative spondylosis   Acute to subacute Schmorl's defect inferior endplate L1   Persistent small central disc protrusion T12-L1   New small noncompressive central disc protrusion L1-2. Persistent bulging annulus and left foraminal annular tear L4-5   Resolution of previous small central disc protrusion L5-S1     Xray bilateral hips 9/29/23  Degenerative changes visualized lower lumbar spine. LEFT HIP: Mild left hip osteoarthritis is seen. RIGHT HIP: Mild right hip osteoarthritis is seen. IMPRESSION: Degenerative changes as described. The following portions of the patient's history were reviewed and updated as appropriate: allergies, current medications, past family history, past medical history, past social history, past surgical history, and problem list.       Objective:    Blood pressure 110/64, pulse 77, height 5' (1.524 m), weight 74.3 kg (163 lb 12.8 oz), not currently breastfeeding. Neurological Exam  On neurological examination patient is alert, awake, oriented and in no distress. Speech is fluent without dysarthria or aphasia. Cranial nerves 2-12 were symmetrically intact bilaterally.   No evidence of any focal weakness or sensory loss in the upper or lower extremities. Motor testing reveals 5/5 strength of the bilateral upper and lower extremities. There was no pronator drift. No fasciculations present. No abnormal involuntary movements. Finger- to-nose reveals no tremor or ataxia and intact proprioceptive function, no dysmetria was noted. Rapid alternating movement normal. Sensation was intact to vibration, light touch, and temperature in bilateral upper and lower extremities, with the exception of diminished sensation to vibration in the RUE compared to the LUE. Deep tendon reflexes were 2+ and symmetric in the bilateral upper and lower extremities. She is able to rise easily without assistance from a seated position. Casual gait is normal including stance, stride, and arm swing. Normal tandem gait. Romberg is absent. There is tenderness on palpation of the midline lumbar spine, with no radicular symptoms presently. Straight leg test was positive in the LLE, negative in the RLE.    ROS:    Review of Systems  Constitutional:  Negative for appetite change, fatigue and fever. HENT: Negative. Negative for hearing loss, tinnitus, trouble swallowing and voice change. Eyes: Negative. Negative for photophobia, pain and visual disturbance. Respiratory: Negative. Negative for shortness of breath. Cardiovascular: Negative. Negative for palpitations. Gastrointestinal: Negative. Negative for nausea and vomiting. Endocrine: Negative. Negative for cold intolerance. Genitourinary: Negative. Negative for dysuria, frequency and urgency. Musculoskeletal:  Negative for back pain, gait problem, myalgias and neck pain. Skin: Negative. Negative for rash. Allergic/Immunologic: Negative. Neurological:  Positive for headaches. Negative for dizziness, tremors, seizures, syncope, facial asymmetry, speech difficulty, weakness, light-headedness and numbness. Hematological: Negative. Does not bruise/bleed easily. Psychiatric/Behavioral: Negative. Negative for confusion, hallucinations and sleep disturbance. Reviewed ROS as entered by medical assistant.

## 2023-10-24 NOTE — PATIENT INSTRUCTIONS
- Complete MRI Lumbar spine STAT  - Complete MRI Brain and Orbits  - Bun/Creat prior  - Complete sed rate/crp (labs)  - Continue Gabapentin three times a day  - Follow up with Orthopedic medicine re: back/hips  - Continue Lexapro 10 mg  - Follow up with your PCP regarding your thyroid function  - Discussed red flags and reasons to go to the ER (progressive weakness, numbness, tingling, loss of bowel/bladder function, saddle anesthesia)  - Follow up in 6 months; sooner if needed

## 2023-10-24 NOTE — ASSESSMENT & PLAN NOTE
Known lumbar DDD; last MRI 11/2015  She c/o progressive lower extremity weakness and low back pain. As well as new symptom of occasional bowel incontinence   She already follows with [facility] orthopedics. Will order STAT MRI Lumbar spine now but emphasized the importance of follow up with orthopedic medicine. Also reviewed red flags and reasons to go to the ER.     Plan:  - Complete MRI Lumbar spine STAT  - Continue Gabapentin three times a day  - Follow up with Orthopedic medicine re: back/hips  - Discussed red flags and reasons to go to the ER (progressive weakness, numbness, tingling, loss of bowel/bladder function, saddle anesthesia)

## 2023-10-24 NOTE — ASSESSMENT & PLAN NOTE
Visual disturbance described as squiggly lines and/or kaleidoscope vision occurring very briefly and intermittently since June 2023. Differentials include thyroid dysfunction as evidenced by recent TSH vs Ocular migraines vs related to known Sjogrens disease vs vascular phenomena such as stroke or inflammatory condition such as arteritis, or optic neuritis although less likely given intermittent nature of symptoms with no associated pain, loss of vision, or other accompanying symptom. I encouraged her to follow up with her PCP regarding her thyroid function. We will proceed with a MRI Brain and orbits wwo with labs prior to evaluate for any stroke, mass, hemorrhage, optic neuritis, or arteritis. She should continue with routine follow ups with Ophthalmology.

## 2023-10-24 NOTE — ASSESSMENT & PLAN NOTE
She reports new onset of bowel incontinence x 2-3 months - when she sits to urinate she passes a soft bowel movement. Once happened in the car as well without warning or the sensation to go. She has a hx of SBO 3 years ago. She follows with GI had a recent conoloscopy which was normal.   She denies any recent falls or traumas. Will evaluate with STAT MRI Lumbar spine. If negative for cord compression, discussed the need for follow up with GI to evaluate for other causes.

## 2023-10-24 NOTE — PROGRESS NOTES
Patient ID: Arleth Smith is a 61 y.o. female. Assessment/Plan:    No problem-specific Assessment & Plan notes found for this encounter. {Assess/PlanSmartLinks:47296}       Subjective:    HPI  Right side of her head she has pain, traveled to her face an feels like its going to swell. Concerned about recent blood work for TSH level was less than 0.01. Also has issues with continence, says when she goes to urinate she has a bowel movement even if she feels like she doesn't have to go.  {[facility] Neurology HPI texts:21228}    {Common ambulatory SmartLinks:96736}         Objective:    not currently breastfeeding. Physical Exam    Neurological Exam      ROS:    Review of Systems   Constitutional:  Negative for appetite change, fatigue and fever. HENT: Negative. Negative for hearing loss, tinnitus, trouble swallowing and voice change. Eyes: Negative. Negative for photophobia, pain and visual disturbance. Respiratory: Negative. Negative for shortness of breath. Cardiovascular: Negative. Negative for palpitations. Gastrointestinal: Negative. Negative for nausea and vomiting. Endocrine: Negative. Negative for cold intolerance. Genitourinary: Negative. Negative for dysuria, frequency and urgency. Musculoskeletal:  Negative for back pain, gait problem, myalgias and neck pain. Skin: Negative. Negative for rash. Allergic/Immunologic: Negative. Neurological:  Positive for headaches. Negative for dizziness, tremors, seizures, syncope, facial asymmetry, speech difficulty, weakness, light-headedness and numbness. Hematological: Negative. Does not bruise/bleed easily. Psychiatric/Behavioral: Negative. Negative for confusion, hallucinations and sleep disturbance.

## 2023-10-25 ENCOUNTER — HOSPITAL ENCOUNTER (OUTPATIENT)
Dept: RADIOLOGY | Facility: IMAGING CENTER | Age: 63
Discharge: HOME | End: 2023-10-25
Payer: COMMERCIAL

## 2023-10-25 DIAGNOSIS — M51.36 DEGENERATIVE DISC DISEASE, LUMBAR: ICD-10-CM

## 2023-10-25 DIAGNOSIS — R15.9 BOWEL INCONTINENCE: ICD-10-CM

## 2023-10-25 PROCEDURE — 72148 MRI LUMBAR SPINE W/O DYE: CPT

## 2023-10-25 PROCEDURE — G1004 CDSM NDSC: HCPCS

## 2023-11-03 LAB
BUN SERPL-MCNC: 9 MG/DL (ref 7–25)
BUN/CREAT SERPL: NORMAL (CALC) (ref 6–22)
CREAT SERPL-MCNC: 0.66 MG/DL (ref 0.5–1.05)
CRP SERPL-MCNC: 1.1 MG/L
ERYTHROCYTE [SEDIMENTATION RATE] IN BLOOD BY WESTERGREN METHOD: 6 MM/H
GFR/BSA.PRED SERPLBLD CYS-BASED-ARV: 99 ML/MIN/1.73M2

## 2023-11-11 ENCOUNTER — HOSPITAL ENCOUNTER (OUTPATIENT)
Dept: MRI IMAGING | Facility: HOSPITAL | Age: 63
Discharge: HOME | End: 2023-11-11
Payer: COMMERCIAL

## 2023-11-11 DIAGNOSIS — H53.9 VISUAL DISTURBANCE: ICD-10-CM

## 2023-11-11 PROCEDURE — A9585 GADOBUTROL INJECTION: HCPCS | Performed by: INTERNAL MEDICINE

## 2023-11-11 PROCEDURE — 70553 MRI BRAIN STEM W/O & W/DYE: CPT

## 2023-11-11 PROCEDURE — 70543 MRI ORBT/FAC/NCK W/O &W/DYE: CPT

## 2023-11-11 PROCEDURE — G1004 CDSM NDSC: HCPCS

## 2023-11-11 RX ORDER — GADOBUTROL 604.72 MG/ML
7 INJECTION INTRAVENOUS
Status: COMPLETED | OUTPATIENT
Start: 2023-11-11 | End: 2023-11-11

## 2023-11-11 RX ADMIN — GADOBUTROL 7 ML: 604.72 INJECTION INTRAVENOUS at 16:03

## 2024-04-23 ENCOUNTER — TELEPHONE (OUTPATIENT)
Dept: NEUROLOGY | Facility: CLINIC | Age: 64
End: 2024-04-23

## 2024-04-30 ENCOUNTER — OFFICE VISIT (OUTPATIENT)
Dept: NEUROLOGY | Facility: CLINIC | Age: 64
End: 2024-04-30
Payer: COMMERCIAL

## 2024-04-30 VITALS
SYSTOLIC BLOOD PRESSURE: 111 MMHG | BODY MASS INDEX: 32.43 KG/M2 | HEIGHT: 60 IN | OXYGEN SATURATION: 100 % | DIASTOLIC BLOOD PRESSURE: 69 MMHG | HEART RATE: 77 BPM | WEIGHT: 165.2 LBS | TEMPERATURE: 97.6 F

## 2024-04-30 DIAGNOSIS — G50.0 TRIGEMINAL NEURALGIA: Primary | ICD-10-CM

## 2024-04-30 DIAGNOSIS — H53.9 VISUAL DISTURBANCE: ICD-10-CM

## 2024-04-30 DIAGNOSIS — F41.9 ANXIETY AND DEPRESSION: ICD-10-CM

## 2024-04-30 DIAGNOSIS — F32.A ANXIETY AND DEPRESSION: ICD-10-CM

## 2024-04-30 PROCEDURE — 99214 OFFICE O/P EST MOD 30 MIN: CPT

## 2024-04-30 NOTE — ASSESSMENT & PLAN NOTE
Lakisha Hampton is a 63 year old female with a hx of trigeminal neuralgia well controlled on Gabapentin 100 mg bid, with an occasional 3rd dose when needed. She denies any adverse effects from gabapentin.     Plan:  - Continue current dosing of Gabapentin   - recent eGFR 99  - Follow up in 8 months; sooner if needed

## 2024-04-30 NOTE — ASSESSMENT & PLAN NOTE
Reports Anxiety is under control and better with Lexapro 10 mg daily  Denies any adverse effects    Plan:  Continue current regimen

## 2024-04-30 NOTE — ASSESSMENT & PLAN NOTE
Visual disturbance described as squiggly lines and/or kaleidoscope vision occurring very briefly and intermittently since June 2023. Differentials include thyroid dysfunction as evidenced by recent TSH vs Ocular migraines vs related to known Sjogrens disease. This is occurring very rare, once since the last time she was seen and is very transient lasting <10 minutes with spontaneous resolution without treatment. MRI Brain and Orbit in the past were unremarkable ruling out any vascular pathology.     I encouraged her to follow up with her PCP regarding her thyroid function. She should continue with routine follow ups with Ophthalmology. She will continue to monitor and track occurrences, if they become frequent or duration becomes longer we can consider increasing gabapentin further for suspected optic migraines. Follow up in 8 months; sooner if needed.

## 2024-06-10 ENCOUNTER — HOSPITAL ENCOUNTER (OUTPATIENT)
Dept: CT IMAGING | Facility: CLINIC | Age: 64
Discharge: HOME | End: 2024-06-10
Payer: COMMERCIAL

## 2024-06-10 DIAGNOSIS — R10.9 ABDOMINAL PAIN, UNSPECIFIED ABDOMINAL LOCATION: ICD-10-CM

## 2024-06-10 DIAGNOSIS — R63.4 LOSS OF WEIGHT: ICD-10-CM

## 2024-06-10 PROCEDURE — 74177 CT ABD & PELVIS W/CONTRAST: CPT

## 2024-06-10 RX ADMIN — IOHEXOL 100 ML: 350 INJECTION, SOLUTION INTRAVENOUS at 11:14

## 2024-08-02 DIAGNOSIS — M79.7 FIBROMYALGIA: ICD-10-CM

## 2024-08-06 RX ORDER — GABAPENTIN 100 MG/1
100 CAPSULE ORAL 3 TIMES DAILY
Qty: 270 CAPSULE | Refills: 1 | Status: SHIPPED | OUTPATIENT
Start: 2024-08-06

## 2024-09-05 DIAGNOSIS — F32.A ANXIETY AND DEPRESSION: ICD-10-CM

## 2024-09-05 DIAGNOSIS — F41.9 ANXIETY AND DEPRESSION: ICD-10-CM

## 2024-09-05 RX ORDER — ESCITALOPRAM OXALATE 10 MG/1
10 TABLET ORAL DAILY
Qty: 90 TABLET | Refills: 0 | Status: SHIPPED | OUTPATIENT
Start: 2024-09-05

## 2024-09-05 NOTE — TELEPHONE ENCOUNTER
Patient needs an appointment. Please contact the patient to schedule an appointment.   Patient will be due for appt 12/2024

## 2024-12-04 DIAGNOSIS — F41.9 ANXIETY AND DEPRESSION: ICD-10-CM

## 2024-12-04 DIAGNOSIS — F32.A ANXIETY AND DEPRESSION: ICD-10-CM

## 2024-12-04 RX ORDER — ESCITALOPRAM OXALATE 10 MG/1
10 TABLET ORAL DAILY
Qty: 90 TABLET | Refills: 0 | Status: SHIPPED | OUTPATIENT
Start: 2024-12-04

## 2024-12-04 NOTE — TELEPHONE ENCOUNTER
Patient needs an appointment. Please contact the patient to schedule an appointment. Last office visit: 4/3/2024

## 2025-02-18 DIAGNOSIS — M79.7 FIBROMYALGIA: ICD-10-CM

## 2025-02-19 RX ORDER — GABAPENTIN 100 MG/1
100 CAPSULE ORAL 3 TIMES DAILY
Qty: 270 CAPSULE | Refills: 0 | Status: SHIPPED | OUTPATIENT
Start: 2025-02-19

## 2025-04-09 ENCOUNTER — OFFICE VISIT (OUTPATIENT)
Age: 65
End: 2025-04-09
Payer: COMMERCIAL

## 2025-04-09 VITALS
WEIGHT: 177.4 LBS | BODY MASS INDEX: 34.83 KG/M2 | HEIGHT: 60 IN | HEART RATE: 74 BPM | SYSTOLIC BLOOD PRESSURE: 120 MMHG | DIASTOLIC BLOOD PRESSURE: 68 MMHG | OXYGEN SATURATION: 97 %

## 2025-04-09 DIAGNOSIS — H53.9 VISUAL DISTURBANCE: ICD-10-CM

## 2025-04-09 DIAGNOSIS — F32.A ANXIETY AND DEPRESSION: ICD-10-CM

## 2025-04-09 DIAGNOSIS — F41.9 ANXIETY AND DEPRESSION: ICD-10-CM

## 2025-04-09 DIAGNOSIS — G50.0 TRIGEMINAL NEURALGIA: Primary | ICD-10-CM

## 2025-04-09 PROCEDURE — 99214 OFFICE O/P EST MOD 30 MIN: CPT

## 2025-04-09 RX ORDER — ESCITALOPRAM OXALATE 10 MG/1
10 TABLET ORAL DAILY
Qty: 90 TABLET | Refills: 3 | Status: SHIPPED | OUTPATIENT
Start: 2025-04-09

## 2025-04-09 RX ORDER — GABAPENTIN 100 MG/1
100 CAPSULE ORAL 3 TIMES DAILY
Qty: 270 CAPSULE | Refills: 3 | Status: SHIPPED | OUTPATIENT
Start: 2025-04-09

## 2025-04-09 NOTE — ASSESSMENT & PLAN NOTE
Lakisha Hampton is a 64 year old female with a hx of trigeminal neuralgia well controlled on Gabapentin 100 mg bid, with an occasional 3rd dose when needed. She denies any adverse effects from gabapentin.     Plan:  - Continue current dosing of Gabapentin   - Follow up in 1 year; sooner if needed    Orders:    escitalopram (LEXAPRO) 10 mg tablet; Take 1 tablet (10 mg total) by mouth daily    gabapentin (NEURONTIN) 100 mg capsule; Take 1 capsule (100 mg total) by mouth 3 (three) times a day

## 2025-04-09 NOTE — ASSESSMENT & PLAN NOTE
Orders:    escitalopram (LEXAPRO) 10 mg tablet; Take 1 tablet (10 mg total) by mouth daily    gabapentin (NEURONTIN) 100 mg capsule; Take 1 capsule (100 mg total) by mouth 3 (three) times a day

## 2025-04-09 NOTE — PROGRESS NOTES
Name: Lakisha Hampton      : 1960      MRN: [id number]  Encounter Provider: JAZMINE Mccoy  Encounter Date: 2025   Encounter department: [facility]  :  Assessment & Plan  Trigeminal neuralgia  Lakisha Hampton is a 64 year old female with a hx of trigeminal neuralgia well controlled on Gabapentin 100 mg bid, with an occasional 3rd dose when needed. She denies any adverse effects from gabapentin.     Plan:  - Continue current dosing of Gabapentin   - Follow up in 1 year; sooner if needed    Orders:    escitalopram (LEXAPRO) 10 mg tablet; Take 1 tablet (10 mg total) by mouth daily    gabapentin (NEURONTIN) 100 mg capsule; Take 1 capsule (100 mg total) by mouth 3 (three) times a day    Anxiety and depression  Reports Anxiety is under control with Lexapro 10 mg daily  Denies any adverse effects    Plan:  Continue current regimen     Orders:    escitalopram (LEXAPRO) 10 mg tablet; Take 1 tablet (10 mg total) by mouth daily    Visual disturbance  Visual disturbance described as squiggly lines and/or kaleidoscope vision occurring very briefly and intermittently since 2023 consistent with optic migraines. Event occur very sporadically and are rare at this time lasting <5 minutes with spontaneous resolution without treatment. MRI Brain and Orbit in the past were unremarkable ruling out any vascular pathology.  She should continue with routine follow ups with Ophthalmology. She will continue to monitor and track occurrences, if they become frequent or duration becomes longer we can consider increasing gabapentin further for suspected optic migraines. Follow up in 1 year; sooner if needed.            History of Present Illness     HPI Lakisha Hampton is a 64 y.o. female who is known to the practice for chronic fibromyalgia, optic migraines and trigeminal neuralgia. At baseline she is maintained on Gabapentin 100 mg up to 3 times daily, average use is twice daily. She was  last seen 4/30/2024 and also follows with her Rhematologist Dr. South for fibromyalgia, Sjogren's disease and Lupus. She is treated with plaquenil and pilocarpine. She is also known to have open angle glaucoma and follows closely with ophthalmology. She is seen by [facility] for plantar fascitis and chronic shoulder pain. She is also maintained on Lexapro 10 mg for anxiety and depression.     She returns today in follow up and provides the below interval history:      Visual Disturbance/suspected optic migraines   She reports optic migraines are infrequent; she cannot say the frequency.  She states it is very sporadic.   Last optic migraine was Sunday, presented as squiggly lines and kaleidoscope vision in both eyes (worse in the left>right). This lasted only a few minutes then  spontaneously resolved.   She had an ophthalmology evaluation in March 2025 which was stable.  She denies any associated numbness, tingling, weakness, difficulty with speech, dizziness, or headache  Mri brain and orbit 11/11/23 was unremarkable as well as crp and sed rate.      Trigeminal Neuralgia  She has continued on Gabapentin 100 mg bid with excellent control  Occasionally she has right TMJ pain and locking which seems to flare up her trigeminal neuralgia.   She no longer has numbness in her face; only noticed when she is stressed.   She denies any sharp shooting pain, tingling, or pins needles sensations.    Anxiety/Depression   Continues on Lexapro to 10 mg for anxiety and depression  She feels her mood is well controlled.   Enjoying her grand children (3 girls and 1 boy)     Review of Systems   Constitutional:  Negative for appetite change, fatigue and fever.   HENT: Negative.  Negative for hearing loss, tinnitus, trouble swallowing and voice change.    Eyes: Negative.  Negative for photophobia, pain and visual disturbance.   Respiratory: Negative.  Negative for shortness of breath.    Cardiovascular: Negative.   Negative for palpitations.   Gastrointestinal: Negative.  Negative for nausea and vomiting.   Endocrine: Negative.  Negative for cold intolerance.   Genitourinary: Negative.  Negative for dysuria, frequency and urgency.   Musculoskeletal:  Negative for back pain, gait problem, myalgias, neck pain and neck stiffness.   Skin: Negative.  Negative for rash.   Allergic/Immunologic: Negative.    Neurological:  Negative for dizziness, tremors, seizures, syncope, facial asymmetry, speech difficulty, weakness, light-headedness, numbness and headaches.   Hematological: Negative.  Does not bruise/bleed easily.   Psychiatric/Behavioral: Negative.  Negative for confusion, hallucinations and sleep disturbance.    All other systems reviewed and are negative.    I have personally reviewed the MA's review of systems and made changes as necessary.    Current Outpatient Medications on File Prior to Visit   Medication Sig Dispense Refill    ALPHAGAN P 0.1 % 2 (two) times a day       aspirin 81 mg chewable tablet Chew 81 mg daily      carboxymethylcellulose 1 % ophthalmic solution Apply 1 drop to eye if needed      Cholecalciferol (VITAMIN D3) 2000 units TABS Take 2,000 Units by mouth 2 (two) times a day      diltiazem (CARDIZEM CD) 180 mg 24 hr capsule Take 180 mg by mouth daily      hydroxychloroquine (PLAQUENIL) 200 mg tablet Take 200 mg by mouth 2 (two) times a day      multivitamin (THERAGRAN) TABS Take 1 tablet by mouth daily      pilocarpine (SALAGEN) 5 mg tablet Take 5 mg by mouth 2 (two) times a day      Wheat Dextrin (BENEFIBER DRINK MIX PO) Take by mouth Twice daily      [DISCONTINUED] escitalopram (LEXAPRO) 10 mg tablet TAKE 1 TABLET DAILY 90 tablet 0    [DISCONTINUED] gabapentin (NEURONTIN) 100 mg capsule Take 1 capsule (100 mg total) by mouth 3 (three) times a day 270 capsule 0     No current facility-administered medications on file prior to visit.         Objective   /68 (BP Location: Right arm, Patient Position:  Sitting, Cuff Size: Standard)   Pulse 74   Ht 5' (1.524 m)   Wt 80.5 kg (177 lb 6.4 oz)   SpO2 97%   BMI 34.65 kg/m²     Neurological Exam  On neurological examination patient is alert, awake, oriented and in no distress. Speech is fluent without dysarthria or aphasia. Cranial nerves 2-12 were symmetrically intact bilaterally. No evidence of any focal weakness or sensory loss in the upper or lower extremities. Motor testing reveals 5/5 strength of the bilateral upper and lower extremities.There was no pronator drift.  No fasciculations present. No abnormal involuntary movements. Finger- to-nose reveals no tremor or ataxia and intact proprioceptive function, no dysmetria was noted. Rapid alternating movement normal. Sensation was intact to vibration, light touch, and temperature in bilateral upper and lower extremities. Deep tendon reflexes were 2+ and symmetric in the bilateral upper and lower extremities. She is able to rise easily without assistance from a seated position. Casual gait is normal including stance, stride, and arm swing. Normal tandem gait. Romberg is absent.       Administrative Statements   I have spent a total time of 30 minutes in caring for this patient on the day of the visit/encounter including Prognosis, Risks and benefits of tx options, Instructions for management, Patient and family education, Importance of tx compliance, Risk factor reductions, Impressions, Counseling / Coordination of care, Documenting in the medical record, Reviewing/placing orders in the medical record (including tests, medications, and/or procedures), and Obtaining or reviewing history  .

## 2025-04-09 NOTE — ASSESSMENT & PLAN NOTE
Visual disturbance described as squiggly lines and/or kaleidoscope vision occurring very briefly and intermittently since June 2023 consistent with optic migraines. Event occur very sporadically and are rare at this time lasting <5 minutes with spontaneous resolution without treatment. MRI Brain and Orbit in the past were unremarkable ruling out any vascular pathology.  She should continue with routine follow ups with Ophthalmology. She will continue to monitor and track occurrences, if they become frequent or duration becomes longer we can consider increasing gabapentin further for suspected optic migraines. Follow up in 1 year; sooner if needed.

## 2025-04-09 NOTE — ASSESSMENT & PLAN NOTE
Reports Anxiety is under control with Lexapro 10 mg daily  Denies any adverse effects    Plan:  Continue current regimen     Orders:    escitalopram (LEXAPRO) 10 mg tablet; Take 1 tablet (10 mg total) by mouth daily

## 2025-08-05 DIAGNOSIS — G50.0 TRIGEMINAL NEURALGIA: ICD-10-CM

## 2025-08-06 RX ORDER — GABAPENTIN 100 MG/1
100 CAPSULE ORAL 3 TIMES DAILY
Qty: 270 CAPSULE | Refills: 0 | Status: SHIPPED | OUTPATIENT
Start: 2025-08-06